# Patient Record
Sex: FEMALE | Race: WHITE | Employment: OTHER | URBAN - METROPOLITAN AREA
[De-identification: names, ages, dates, MRNs, and addresses within clinical notes are randomized per-mention and may not be internally consistent; named-entity substitution may affect disease eponyms.]

---

## 2019-10-09 PROBLEM — F32.5 MAJOR DEPRESSIVE DISORDER WITH SINGLE EPISODE, IN FULL REMISSION (HCC): Status: ACTIVE | Noted: 2019-10-09

## 2019-10-09 PROBLEM — Z12.39 BREAST CANCER SCREENING: Status: ACTIVE | Noted: 2019-10-09

## 2019-10-09 PROBLEM — I77.810 AORTIC ROOT DILATATION (HCC): Status: ACTIVE | Noted: 2019-10-09

## 2019-10-09 PROBLEM — Z00.00 ROUTINE MEDICAL EXAM: Status: ACTIVE | Noted: 2019-10-09

## 2019-10-09 PROBLEM — Z12.11 COLON CANCER SCREENING: Status: ACTIVE | Noted: 2019-10-09

## 2019-10-09 PROBLEM — I71.00 DISSECTION OF AORTA, UNSPECIFIED PORTION OF AORTA (HCC): Status: ACTIVE | Noted: 2019-10-09

## 2024-12-03 ENCOUNTER — HOSPITAL ENCOUNTER (OUTPATIENT)
Dept: CT IMAGING | Facility: HOSPITAL | Age: 69
Discharge: HOME | End: 2024-12-03
Attending: SURGERY
Payer: MEDICARE

## 2024-12-03 DIAGNOSIS — I71.010 DISSECTION OF ASCENDING AORTA (HCC): ICD-10-CM

## 2024-12-03 LAB
CREAT BLD-MCNC: 0.6 MG/DL
EGFRCR SERPLBLD CKD-EPI 2021: 97 ML/MIN/1.73M2 (ref 60–?)

## 2024-12-03 PROCEDURE — 71275 CT ANGIOGRAPHY CHEST: CPT | Performed by: SURGERY

## 2024-12-03 PROCEDURE — 74174 CTA ABD&PLVS W/CONTRAST: CPT | Performed by: SURGERY

## 2024-12-03 PROCEDURE — 82565 ASSAY OF CREATININE: CPT

## 2024-12-09 RX ORDER — ASPIRIN 81 MG/1
81 TABLET ORAL DAILY
COMMUNITY

## 2024-12-09 RX ORDER — LISINOPRIL 2.5 MG/1
2.5 TABLET ORAL DAILY
COMMUNITY
End: 2024-12-16

## 2024-12-09 NOTE — PAT NURSING NOTE
PAT nursing note: late add-on; spoke with daughter Karissa; strict npo after 2200; shower with antibacterial soap; continue to take all prescribed medications as directed except: the morning of surgery only take Metoprolol and Aspirin with a sip of water; last dose of vitamins, supplements, herbal products and NSAIDs today; last dose of Marijuana today; denies taking blood thinners, ACEs, ARBs, diabetic or weight loss medications; no PPM, ICD, no nerve or spinal cord stimulator; admit; 2 visitors welcome; park in the [employer] garage at [facility]; register at the [facility] reception desk in the lobby at 0600 am; keep personal possessions to a minimum: bring insurance card(s)/picture ID, toiletries, wear comfortable clothing, bring glasses/eye glass case, bring denture cup/supplies; leave all valuables at home, including jewelry; discussed intra-op and post-op instructions; all questions answered; daughter verbalized understanding of all instructions.

## 2024-12-10 ENCOUNTER — HOSPITAL ENCOUNTER (INPATIENT)
Facility: HOSPITAL | Age: 69
LOS: 6 days | Discharge: HOME HEALTH | End: 2024-12-16
Attending: SURGERY | Admitting: SURGERY
Payer: MEDICARE

## 2024-12-10 ENCOUNTER — ANESTHESIA EVENT (OUTPATIENT)
Dept: CARDIAC SURGERY | Facility: HOSPITAL | Age: 69
End: 2024-12-10
Payer: MEDICARE

## 2024-12-10 ENCOUNTER — ANESTHESIA (OUTPATIENT)
Dept: CARDIAC SURGERY | Facility: HOSPITAL | Age: 69
End: 2024-12-10
Payer: MEDICARE

## 2024-12-10 ENCOUNTER — APPOINTMENT (OUTPATIENT)
Dept: GENERAL RADIOLOGY | Facility: HOSPITAL | Age: 69
End: 2024-12-10
Attending: ANESTHESIOLOGY
Payer: MEDICARE

## 2024-12-10 LAB
ANION GAP SERPL CALC-SCNC: 7 MMOL/L (ref 0–18)
ANTIBODY SCREEN: NEGATIVE
APTT PPP: 26.8 SECONDS (ref 23–36)
BUN BLD-MCNC: 11 MG/DL (ref 9–23)
CALCIUM BLD-MCNC: 7.6 MG/DL (ref 8.7–10.4)
CHLORIDE SERPL-SCNC: 114 MMOL/L (ref 98–112)
CO2 SERPL-SCNC: 22 MMOL/L (ref 21–32)
CREAT BLD-MCNC: 0.58 MG/DL
EGFRCR SERPLBLD CKD-EPI 2021: 98 ML/MIN/1.73M2 (ref 60–?)
ERYTHROCYTE [DISTWIDTH] IN BLOOD BY AUTOMATED COUNT: 14.8 %
GLUCOSE BLD-MCNC: 115 MG/DL (ref 70–99)
HCT VFR BLD AUTO: 34.6 %
HGB BLD-MCNC: 11.8 G/DL
HGB BLD-MCNC: 12.8 G/DL
INR BLD: 1.01 (ref 0.8–1.2)
INR BLD: 1.2 (ref 0.8–1.2)
MCH RBC QN AUTO: 32.4 PG (ref 26–34)
MCHC RBC AUTO-ENTMCNC: 34.1 G/DL (ref 31–37)
MCV RBC AUTO: 95.1 FL
MRSA DNA SPEC QL NAA+PROBE: NEGATIVE
OSMOLALITY SERPL CALC.SUM OF ELEC: 296 MOSM/KG (ref 275–295)
PLATELET # BLD AUTO: 123 10(3)UL (ref 150–450)
POTASSIUM SERPL-SCNC: 3.6 MMOL/L (ref 3.5–5.1)
PROTHROMBIN TIME: 13.4 SECONDS (ref 11.6–14.8)
PROTHROMBIN TIME: 15.3 SECONDS (ref 11.6–14.8)
RBC # BLD AUTO: 3.64 X10(6)UL
RH BLOOD TYPE: POSITIVE
RH BLOOD TYPE: POSITIVE
SODIUM SERPL-SCNC: 143 MMOL/L (ref 136–145)
WBC # BLD AUTO: 7.4 X10(3) UL (ref 4–11)

## 2024-12-10 PROCEDURE — B440ZZ3 ULTRASONOGRAPHY OF ABDOMINAL AORTA, INTRAVASCULAR: ICD-10-PCS | Performed by: SURGERY

## 2024-12-10 PROCEDURE — B410YZZ FLUOROSCOPY OF ABDOMINAL AORTA USING OTHER CONTRAST: ICD-10-PCS | Performed by: SURGERY

## 2024-12-10 PROCEDURE — 71045 X-RAY EXAM CHEST 1 VIEW: CPT | Performed by: ANESTHESIOLOGY

## 2024-12-10 PROCEDURE — B310YZZ FLUOROSCOPY OF THORACIC AORTA USING OTHER CONTRAST: ICD-10-PCS | Performed by: SURGERY

## 2024-12-10 PROCEDURE — 02VW3DZ RESTRICTION OF THORACIC AORTA, DESCENDING WITH INTRALUMINAL DEVICE, PERCUTANEOUS APPROACH: ICD-10-PCS | Performed by: SURGERY

## 2024-12-10 PROCEDURE — B41GYZZ FLUOROSCOPY OF LEFT LOWER EXTREMITY ARTERIES USING OTHER CONTRAST: ICD-10-PCS | Performed by: SURGERY

## 2024-12-10 PROCEDURE — B41FYZZ FLUOROSCOPY OF RIGHT LOWER EXTREMITY ARTERIES USING OTHER CONTRAST: ICD-10-PCS | Performed by: SURGERY

## 2024-12-10 PROCEDURE — 04HJ3DZ INSERTION OF INTRALUMINAL DEVICE INTO LEFT EXTERNAL ILIAC ARTERY, PERCUTANEOUS APPROACH: ICD-10-PCS | Performed by: SURGERY

## 2024-12-10 PROCEDURE — B340ZZ3 ULTRASONOGRAPHY OF THORACIC AORTA, INTRAVASCULAR: ICD-10-PCS | Performed by: SURGERY

## 2024-12-10 PROCEDURE — B44HZZ3 ULTRASONOGRAPHY OF BILATERAL LOWER EXTREMITY ARTERIES, INTRAVASCULAR: ICD-10-PCS | Performed by: SURGERY

## 2024-12-10 PROCEDURE — 04V03FZ RESTRICTION OF ABDOMINAL AORTA WITH BRANCHED OR FENESTRATED INTRALUMINAL DEVICE, THREE OR MORE ARTERIES, PERCUTANEOUS APPROACH: ICD-10-PCS | Performed by: SURGERY

## 2024-12-10 DEVICE — IMPLANTABLE DEVICE: Type: IMPLANTABLE DEVICE | Site: ABDOMEN | Status: FUNCTIONAL

## 2024-12-10 DEVICE — ICAST COVERED STENT SYSTEM, 7MMX38MMX120CM
Type: IMPLANTABLE DEVICE | Status: FUNCTIONAL
Brand: ICAST COVERED STENT SYSTEM, 7MMX38MMX120CM

## 2024-12-10 DEVICE — ZENITH, SPIRAL-Z AAA ILIAC LEG
Type: IMPLANTABLE DEVICE | Status: FUNCTIONAL
Brand: ZENITH SPIRAL-Z

## 2024-12-10 DEVICE — ZENITH FLEX, AAA ENDOVASCULAR GRAFT MAIN BODY
Type: IMPLANTABLE DEVICE | Status: FUNCTIONAL
Brand: ZENITH FLEX

## 2024-12-10 DEVICE — ICAST COVERED STENT SYSTEM, 7MMX22MMX120CM
Type: IMPLANTABLE DEVICE | Status: FUNCTIONAL
Brand: ICAST COVERED STENT SYSTEM, 7MMX22MMX120CM

## 2024-12-10 RX ORDER — HYDROCODONE BITARTRATE AND ACETAMINOPHEN 10; 325 MG/1; MG/1
2 TABLET ORAL EVERY 6 HOURS PRN
Status: DISCONTINUED | OUTPATIENT
Start: 2024-12-10 | End: 2024-12-16

## 2024-12-10 RX ORDER — PHENYLEPHRINE HCL IN 0.9% NACL 50MG/250ML
PLASTIC BAG, INJECTION (ML) INTRAVENOUS CONTINUOUS PRN
Status: DISCONTINUED | OUTPATIENT
Start: 2024-12-10 | End: 2024-12-13

## 2024-12-10 RX ORDER — DOCUSATE SODIUM 100 MG/1
100 CAPSULE, LIQUID FILLED ORAL 2 TIMES DAILY
Status: DISCONTINUED | OUTPATIENT
Start: 2024-12-10 | End: 2024-12-16

## 2024-12-10 RX ORDER — HYDROCODONE BITARTRATE AND ACETAMINOPHEN 10; 325 MG/1; MG/1
1 TABLET ORAL EVERY 6 HOURS PRN
Status: DISCONTINUED | OUTPATIENT
Start: 2024-12-10 | End: 2024-12-16

## 2024-12-10 RX ORDER — ESMOLOL HYDROCHLORIDE 10 MG/ML
INJECTION INTRAVENOUS AS NEEDED
Status: DISCONTINUED | OUTPATIENT
Start: 2024-12-10 | End: 2024-12-10 | Stop reason: SURG

## 2024-12-10 RX ORDER — ONDANSETRON 2 MG/ML
INJECTION INTRAMUSCULAR; INTRAVENOUS AS NEEDED
Status: DISCONTINUED | OUTPATIENT
Start: 2024-12-10 | End: 2024-12-10 | Stop reason: SURG

## 2024-12-10 RX ORDER — HEPARIN SODIUM 5000 [USP'U]/ML
INJECTION, SOLUTION INTRAVENOUS; SUBCUTANEOUS AS NEEDED
Status: DISCONTINUED | OUTPATIENT
Start: 2024-12-10 | End: 2024-12-10 | Stop reason: SURG

## 2024-12-10 RX ORDER — SODIUM CHLORIDE, SODIUM LACTATE, POTASSIUM CHLORIDE, CALCIUM CHLORIDE 600; 310; 30; 20 MG/100ML; MG/100ML; MG/100ML; MG/100ML
INJECTION, SOLUTION INTRAVENOUS CONTINUOUS
Status: DISCONTINUED | OUTPATIENT
Start: 2024-12-10 | End: 2024-12-10

## 2024-12-10 RX ORDER — ENOXAPARIN SODIUM 100 MG/ML
30 INJECTION SUBCUTANEOUS DAILY
Status: DISCONTINUED | OUTPATIENT
Start: 2024-12-10 | End: 2024-12-16

## 2024-12-10 RX ORDER — HYDROMORPHONE HYDROCHLORIDE 1 MG/ML
0.2 INJECTION, SOLUTION INTRAMUSCULAR; INTRAVENOUS; SUBCUTANEOUS EVERY 2 HOUR PRN
Status: DISCONTINUED | OUTPATIENT
Start: 2024-12-10 | End: 2024-12-16

## 2024-12-10 RX ORDER — METOCLOPRAMIDE HYDROCHLORIDE 5 MG/ML
10 INJECTION INTRAMUSCULAR; INTRAVENOUS EVERY 8 HOURS PRN
Status: DISCONTINUED | OUTPATIENT
Start: 2024-12-10 | End: 2024-12-16

## 2024-12-10 RX ORDER — METOCLOPRAMIDE HYDROCHLORIDE 5 MG/ML
INJECTION INTRAMUSCULAR; INTRAVENOUS AS NEEDED
Status: DISCONTINUED | OUTPATIENT
Start: 2024-12-10 | End: 2024-12-10 | Stop reason: SURG

## 2024-12-10 RX ORDER — NITROGLYCERIN 20 MG/100ML
INJECTION INTRAVENOUS CONTINUOUS PRN
Status: DISCONTINUED | OUTPATIENT
Start: 2024-12-10 | End: 2024-12-13

## 2024-12-10 RX ORDER — SODIUM CHLORIDE, SODIUM LACTATE, POTASSIUM CHLORIDE, CALCIUM CHLORIDE 600; 310; 30; 20 MG/100ML; MG/100ML; MG/100ML; MG/100ML
INJECTION, SOLUTION INTRAVENOUS CONTINUOUS PRN
Status: DISCONTINUED | OUTPATIENT
Start: 2024-12-10 | End: 2024-12-10 | Stop reason: SURG

## 2024-12-10 RX ORDER — NALOXONE HYDROCHLORIDE 0.4 MG/ML
0.08 INJECTION, SOLUTION INTRAMUSCULAR; INTRAVENOUS; SUBCUTANEOUS AS NEEDED
Status: ACTIVE | OUTPATIENT
Start: 2024-12-10 | End: 2024-12-10

## 2024-12-10 RX ORDER — SODIUM CHLORIDE, SODIUM LACTATE, POTASSIUM CHLORIDE, CALCIUM CHLORIDE 600; 310; 30; 20 MG/100ML; MG/100ML; MG/100ML; MG/100ML
INJECTION, SOLUTION INTRAVENOUS CONTINUOUS
Status: DISCONTINUED | OUTPATIENT
Start: 2024-12-10 | End: 2024-12-11

## 2024-12-10 RX ORDER — ONDANSETRON 2 MG/ML
4 INJECTION INTRAMUSCULAR; INTRAVENOUS ONCE AS NEEDED
Status: ACTIVE | OUTPATIENT
Start: 2024-12-10 | End: 2024-12-10

## 2024-12-10 RX ORDER — HYDROMORPHONE HYDROCHLORIDE 1 MG/ML
0.8 INJECTION, SOLUTION INTRAMUSCULAR; INTRAVENOUS; SUBCUTANEOUS EVERY 2 HOUR PRN
Status: DISCONTINUED | OUTPATIENT
Start: 2024-12-10 | End: 2024-12-16

## 2024-12-10 RX ORDER — ASPIRIN 81 MG/1
81 TABLET ORAL DAILY
Status: DISCONTINUED | OUTPATIENT
Start: 2024-12-10 | End: 2024-12-16

## 2024-12-10 RX ORDER — LABETALOL HYDROCHLORIDE 5 MG/ML
5 INJECTION, SOLUTION INTRAVENOUS EVERY 5 MIN PRN
Status: ACTIVE | OUTPATIENT
Start: 2024-12-10 | End: 2024-12-10

## 2024-12-10 RX ORDER — HYDROMORPHONE HYDROCHLORIDE 1 MG/ML
0.4 INJECTION, SOLUTION INTRAMUSCULAR; INTRAVENOUS; SUBCUTANEOUS EVERY 2 HOUR PRN
Status: DISCONTINUED | OUTPATIENT
Start: 2024-12-10 | End: 2024-12-16

## 2024-12-10 RX ORDER — DEXAMETHASONE SODIUM PHOSPHATE 4 MG/ML
VIAL (ML) INJECTION AS NEEDED
Status: DISCONTINUED | OUTPATIENT
Start: 2024-12-10 | End: 2024-12-10 | Stop reason: SURG

## 2024-12-10 RX ORDER — SODIUM CHLORIDE 9 MG/ML
INJECTION, SOLUTION INTRAVENOUS CONTINUOUS
Status: DISCONTINUED | OUTPATIENT
Start: 2024-12-10 | End: 2024-12-10

## 2024-12-10 RX ORDER — HYDROMORPHONE HYDROCHLORIDE 1 MG/ML
0.4 INJECTION, SOLUTION INTRAMUSCULAR; INTRAVENOUS; SUBCUTANEOUS EVERY 5 MIN PRN
Status: ACTIVE | OUTPATIENT
Start: 2024-12-10 | End: 2024-12-10

## 2024-12-10 RX ORDER — PROTAMINE SULFATE 10 MG/ML
INJECTION, SOLUTION INTRAVENOUS AS NEEDED
Status: DISCONTINUED | OUTPATIENT
Start: 2024-12-10 | End: 2024-12-10 | Stop reason: SURG

## 2024-12-10 RX ORDER — ONDANSETRON 2 MG/ML
4 INJECTION INTRAMUSCULAR; INTRAVENOUS EVERY 6 HOURS PRN
Status: DISCONTINUED | OUTPATIENT
Start: 2024-12-10 | End: 2024-12-16

## 2024-12-10 RX ADMIN — METOCLOPRAMIDE HYDROCHLORIDE 10 MG: 5 INJECTION INTRAMUSCULAR; INTRAVENOUS at 08:47:00

## 2024-12-10 RX ADMIN — HEPARIN SODIUM 1000 UNITS: 5000 INJECTION, SOLUTION INTRAVENOUS; SUBCUTANEOUS at 10:59:00

## 2024-12-10 RX ADMIN — PROTAMINE SULFATE 10 MG: 10 INJECTION, SOLUTION INTRAVENOUS at 13:21:00

## 2024-12-10 RX ADMIN — SODIUM CHLORIDE, SODIUM LACTATE, POTASSIUM CHLORIDE, CALCIUM CHLORIDE: 600; 310; 30; 20 INJECTION, SOLUTION INTRAVENOUS at 10:59:00

## 2024-12-10 RX ADMIN — HEPARIN SODIUM 1000 UNITS: 5000 INJECTION, SOLUTION INTRAVENOUS; SUBCUTANEOUS at 09:32:00

## 2024-12-10 RX ADMIN — ESMOLOL HYDROCHLORIDE 20 MG: 10 INJECTION INTRAVENOUS at 10:18:00

## 2024-12-10 RX ADMIN — HEPARIN SODIUM 2000 UNITS: 5000 INJECTION, SOLUTION INTRAVENOUS; SUBCUTANEOUS at 12:30:00

## 2024-12-10 RX ADMIN — ONDANSETRON 4 MG: 2 INJECTION INTRAMUSCULAR; INTRAVENOUS at 13:30:00

## 2024-12-10 RX ADMIN — SODIUM CHLORIDE, SODIUM LACTATE, POTASSIUM CHLORIDE, CALCIUM CHLORIDE: 600; 310; 30; 20 INJECTION, SOLUTION INTRAVENOUS at 07:57:00

## 2024-12-10 RX ADMIN — DEXAMETHASONE SODIUM PHOSPHATE 8 MG: 4 MG/ML VIAL (ML) INJECTION at 08:47:00

## 2024-12-10 RX ADMIN — HEPARIN SODIUM 7000 UNITS: 5000 INJECTION, SOLUTION INTRAVENOUS; SUBCUTANEOUS at 09:05:00

## 2024-12-10 RX ADMIN — PROTAMINE SULFATE 40 MG: 10 INJECTION, SOLUTION INTRAVENOUS at 13:24:00

## 2024-12-10 NOTE — ANESTHESIA PROCEDURE NOTES
Central Line    Performed by: Oni Masters MD  Authorized by: Oni Masters MD    General Information and Staff    Procedure Start:   Anesthesiologist:  Oni Masters MD  Performed by:  Anesthesiologist  Patient Location:  OR  Indication: central venous access and CVP monitoring    Site Identification: real time ultrasound guided    Preanesthetic Checklist: 2 patient identifiers, IV checked, risks and benefits discussed, monitors and equipment checked, pre-op evaluation, timeout performed, anesthesia consent and sterile technique used    Procedure Detail    Patient Position:  Trendelenburg  Laterality:  Right  Site:  Internal jugular  Prep:  Chloraprep  Catheter Size:  9 Fr  Catheter Type:  MAC introducer  Number of Lumens:  Double lumen  Procedure Detail: target vein identified, needle advanced into vein and blood aspirated and guidewire advanced into vein    Seldinger Technique?: Yes    Intravenous Verification: verified by ultrasound and venous blood return    Post Insertion: all ports aspirated, all ports flushed easily, guidewire was removed intact, line was sutured in place and dressing was applied      Assessment    Events: patient tolerated procedure well with no complications      PA Catheter Placement    PA Catheter Placed?: No      Additional Comments     Triple lumen slick placed via introducer lumen. All ports aspirated and flushed.

## 2024-12-10 NOTE — ANESTHESIA PROCEDURE NOTES
Airway  Date/Time: 12/10/2024 8:08 AM  Urgency: elective      General Information and Staff    Patient location during procedure: OR  Anesthesiologist: Oni Masters MD  Performed: anesthesiologist   Performed by: Oni Masters MD  Authorized by: Oni Masters MD      Indications and Patient Condition  Indications for airway management: anesthesia  Sedation level: deep  Preoxygenated: yes  Patient position: sniffing  Mask difficulty assessment: 0 - not attempted    Final Airway Details  Final airway type: endotracheal airway      Successful airway: ETT  Cuffed: yes   Successful intubation technique: direct laryngoscopy  Endotracheal tube insertion site: oral  Blade: Jordana  Blade size: #3  ETT size (mm): 7.5    Cormack-Lehane Classification: grade I - full view of glottis  Placement verified by: capnometry   Measured from: lips  ETT to lips (cm): 21  Number of attempts at approach: 1

## 2024-12-10 NOTE — ANESTHESIA PREPROCEDURE EVALUATION
PRE-OP EVALUATION    Patient Name: Sherri Gillette    Admit Diagnosis: dissection of thoracoabdominal aortic aneurysm    Pre-op Diagnosis: dissection of thoracoabdominal aortic aneurysm    ENDOVASCULAR ABDOMINAL AORTIC ANEURYSM  STENT GRAFT REPAIR WITH FENESTRATED DEVICE, NEUROMONITORING    Anesthesia Procedure: ENDOVASCULAR ABDOMINAL AORTIC ANEURYSM  STENT GRAFT REPAIR WITH FENESTRATED DEVICE, NEUROMONITORING    Surgeons and Role:     * Hamlet Hoffman MD - Primary     * Moe Camargo MD    Pre-op vitals reviewed.        Body mass index is 18.71 kg/m².    Current medications reviewed.  Hospital Medications:  No current facility-administered medications on file as of 12/10/2024.       Outpatient Medications:   Prescriptions Prior to Admission[1]    Allergies: Patient has no known allergies.      Anesthesia Evaluation    Patient summary reviewed.    Anesthetic Complications  (-) history of anesthetic complications         GI/Hepatic/Renal    Negative GI/hepatic/renal ROS.                             Cardiovascular  Comment: Chronic Type B dissection. S/p aortic root replacement with revision sternotomy, ascending aorta and arch replacement      Exercise tolerance: poor           (+) hypertension     (+) CAD                                Endo/Other    Negative endo/other ROS.                              Pulmonary    Negative pulmonary ROS.                       Neuro/Psych      (+) depression    (+) CVA                            Past Surgical History:   Procedure Laterality Date    Ascending aortic graft  2005    [facility] [place]    Ascending aortic graft  09/22/2015    REDO sternotomy w/ ascending aorta and arch replacement    Chemotherapy  1999    Lumpectomy right  03/1999    Mastectomy right  2003    Other surgical history      Mastectomy    Percutaneous cv procedures  01/26/2016    percutaneous thoracic aortic endograft, [facility]    Radiation  right  1999     Social History     Socioeconomic History    Marital status:    Tobacco Use    Smoking status: Former    Smokeless tobacco: Former     Quit date: 11/28/2014    Tobacco comments:     0.25 units per day, 30 years, 7 unit years   Vaping Use    Vaping status: Never Used   Substance and Sexual Activity    Alcohol use: Yes     Comment: wine rarely    Drug use: Yes     Types: Cannabis   Other Topics Concern    Caffeine Concern Yes     Comment: Coffee, soda > 6 cups daily     History   Drug Use    Types: Cannabis     Available pre-op labs reviewed.               Airway      Mallampati: II  Mouth opening: >3 FB  TM distance: 4 - 6 cm  Neck ROM: full Cardiovascular    Cardiovascular exam normal.         Dental  Comment: Multiple missing teeth, poor dentition           Pulmonary    Pulmonary exam normal.                 Other findings              ASA: 3   Plan: general  NPO status verified and patient meets guidelines.          Plan/risks discussed with: patient            Plan for GA with ETT placement, arterial line, CVC placement. Neuromonitoring regimen. Possible lumbar drain. A detailed discussion of the risks and benefits of the proposed anesthetic was had in the preoperative area. Specifically, the common intrinsic risks of a general anesthetic were discussed including reasonable postoperative pain expectations for the procedure, nausea/vomiting, dental damage, sore throat and adverse reactions related to medications administered. Questions answered and patient wishes to proceed.         Present on Admission:  **None**             [1]   Medications Prior to Admission   Medication Sig Dispense Refill Last Dose/Taking    lisinopril 2.5 MG Oral Tab Take 1 tablet (2.5 mg total) by mouth daily.   12/10/2024 Morning    aspirin 81 MG Oral Tab EC Take 1 tablet (81 mg total) by mouth daily.   12/10/2024 Morning    metoprolol Tartrate 25 MG Oral Tab Take one tablet by mouth twice daily 180 tablet 3  12/10/2024 Morning    Clopidogrel Bisulfate 75 MG Oral Tab Take 1 tablet (75 mg total) by mouth daily. 90 tablet 3     buPROPion HCl ER, XL, (WELLBUTRIN XL) 150 MG Oral Tablet 24 Hr Take 1 tablet (150 mg total) by mouth daily. 30 tablet 5

## 2024-12-10 NOTE — ANESTHESIA POSTPROCEDURE EVALUATION
[facility]    Sherri Gillette Patient Status:  Inpatient   Age/Gender 69 year old female MRN [id number]   Location [facility] 4SW-A Attending Hamlet Hoffman MD   Hosp Day # 0 PCP Sb Villa DO       Anesthesia Post-op Note    ENDOVASCULAR ABDOMINAL AORTIC ANEURYSM  STENT GRAFT REPAIR WITH FENESTRATED DEVICE, NEUROMONITORING. FOR MORE INFO SEE CATH LAB CHARTING    Procedure Summary       Date: 12/10/24 Room / Location:  CVOR 03 HYBRID /  CVOR    Anesthesia Start: 0757 Anesthesia Stop: 1355    Procedure: ENDOVASCULAR ABDOMINAL AORTIC ANEURYSM  STENT GRAFT REPAIR WITH FENESTRATED DEVICE, NEUROMONITORING. FOR MORE INFO SEE CATH LAB CHARTING Diagnosis: (dissection of thoracoabdominal aortic aneurysm)    Surgeons: Hamlet Hoffman MD Anesthesiologist: Oni Masters MD    Anesthesia Type: general ASA Status: 3            Anesthesia Type: general    Vitals Value Taken Time   /80 12/10/24 1355   Temp 97.8 °F (36.6 °C) 12/10/24 1355   Pulse 61 12/10/24 1355   Resp 15 12/10/24 1355   SpO2 97 % 12/10/24 1355   Vitals shown include unfiled device data.    Patient Location: ICU    Anesthesia Type: general    Airway Patency: patent and extubated    Postop Pain Control: adequate    Mental Status: mildly sedated but able to meaningfully participate in the post-anesthesia evaluation    Nausea/Vomiting: none    Cardiopulmonary/Hydration status: stable euvolemic    Complications: no apparent anesthesia related complications    Postop vital signs: stable    Dental Exam: Unchanged from Preop    Sign out given to ICU staff.

## 2024-12-10 NOTE — CONSULTS
General Medicine Consult      Reason for consult: Medical management    Consulted by: Dr. Hoffman    PCP: Sb Villa DO      History of Present Illness: Patient is a 69 year old female with PMH sig for hypertension, CVA, PAD abdominal aneurysm who presents for elective repair.  Patient seen postoperatively.  Her pain is controlled.  She denies any chest pain or shortness of breath.  Family is at bedside.    PMH:  Past Medical History:    Atherosclerosis of coronary artery    Breast cancer (HCC)    right mastectomy 2003    Depression    High blood pressure    Peripheral vascular disease (HCC)    Stroke (cerebrum) (HCC)    TIA march.17 2015        PSH:  Past Surgical History:   Procedure Laterality Date    Ascending aortic graft  2005    [facility] [place]    Ascending aortic graft  09/22/2015    REDO sternotomy w/ ascending aorta and arch replacement    Chemotherapy  1999    Lumpectomy right  03/1999    Mastectomy right  2003    Other surgical history      Mastectomy    Percutaneous cv procedures  01/26/2016    percutaneous thoracic aortic endograft, [facility] [place]    Radiation right  1999        Home Medications:  Medications Taking[1]    Scheduled Medication:   aspirin  81 mg Oral Daily    enoxaparin  30 mg Subcutaneous Daily    ceFAZolin  2 g Intravenous Q8H    fentaNYL        potassium chloride  40 mEq Intravenous Once    docusate sodium  100 mg Oral BID     Continuous Infusing Medication:   nitroGLYCERIN in dextrose 5%      niCARdipine Stopped (12/10/24 1509)    phenylephrine Stopped (12/10/24 1610)    norepinephrine      lactated ringers 100 mL/hr at 12/10/24 1610     PRN Medication:  ondansetron    metoclopramide    nitroGLYCERIN in dextrose 5%    niCARdipine    phenylephrine    norepinephrine    atropine    naloxone    labetalol    ondansetron    fentaNYL    HYDROmorphone **OR** HYDROmorphone **OR** HYDROmorphone    HYDROcodone-acetaminophen **OR**  HYDROcodone-acetaminophen     ALL:  Allergies[2]     Soc Hx:  Social History     Tobacco Use    Smoking status: Former    Smokeless tobacco: Former     Quit date: 11/28/2014    Tobacco comments:     0.25 units per day, 30 years, 7 unit years   Substance Use Topics    Alcohol use: Yes     Comment: wine rarely        Fam Hx  Family History   Problem Relation Age of Onset    Cancer Father     Heart Disease Mother     Cancer Other         Cancer-colon, Aunt       Review of Systems  Comprehensive ROS reviewed and negative except for what's stated above.  Including negative for chest pain, shortness of breath, syncope.       OBJECTIVE:  /53 (BP Location: Left arm)   Pulse 73   Temp 97.7 °F (36.5 °C) (Temporal)   Resp 20   Ht 5' 3\" (1.6 m)   Wt 105 lb 9.6 oz (47.9 kg)   SpO2 100%   BMI 18.71 kg/m²   General:  Alert, no distress, appears stated age.   Head:  Normocephalic, without obvious abnormality, atraumatic.   Eyes:  Sclera anicteric, No conjunctival pallor, EOMs intact.    Nose: Nares normal. Septum midline. Mucosa normal. No drainage.   Throat: Lips, mucosa, and tongue normal. Teeth and gums normal.   Neck: Supple, symmetrical, trachea midline   Lungs:   Clear to auscultation bilaterally. Normal effort   Chest wall:  No tenderness or deformity.   Heart:  Regular rate and rhythm, S1, S2 normal,   Abdomen:   Soft, non-tender. Bowel sounds normal.  Non distended   Extremities: Extremities normal, atraumatic, no cyanosis or edema.,  Cath site is free of hematoma, gait not assessed   Skin: Skin color, texture, turgor normal. No rashes or lesions.    Neurologic: Normal strength, no focal deficit appreciated       Diagnostics:   CBC/Chem  Recent Labs   Lab 12/10/24  0621 12/10/24  1433   WBC  --  7.4   HGB  --  11.8*   MCV  --  95.1   PLT  --  123.0*   INR 1.01 1.20       Recent Labs   Lab 12/10/24  1433      K 3.6   *   CO2 22.0   BUN 11   CREATSERUM 0.58   *   CA 7.6*       No results for  input(s): \"ALT\", \"AST\", \"ALB\", \"AMYLASE\", \"LIPASE\", \"LDH\" in the last 168 hours.    Invalid input(s): \"ALPHOS\", \"TBIL\", \"DBIL\", \"TPROT\"    No results for input(s): \"TROP\" in the last 168 hours.      Radiology: XR CHEST AP PORTABLE  (CPT=71045)    Result Date: 12/10/2024  CONCLUSION:   Postoperative changes of cardiothoracic surgery including placement of aortic stent graft.  Heart size upper limits normal.  Findings of pulmonary venous hypertension with mild interstitial pulmonary edema.  No discrete airspace consolidation.  The pleural spaces are clear.  Right IJ sheath in place with central venous catheter terminating in the mid SVC.   LOCATION:  [id number]      Dictated by (CST): Apurva Machado MD on 12/10/2024 at 3:09 PM     Finalized by (CST): Apurva Machado MD on 12/10/2024 at 3:10 PM          ASSESSMENT / PLAN:    Patient is a 69 year old female with PMH sig for hypertension, CVA, PAD abdominal aneurysm who presents for elective repair.    Abdominal aortic aneurysm  Status post endovascular repair of abdominal aortic aneurysm, POD 0  Postoperative pain  - IV/p.o. pain medications  - Blood pressure control per vascular surgery  - Monitor for acute blood loss anemia, follow CBC  -Continue aspirin  - Vascular surgery following, recommendations reviewed    Hypertension  - Holding lisinopril and metoprolol for now  - Using PRNs to allow for tighter blood control    Prophylaxis:   DVT with SCDs  Atrophy Prophylaxis ambulate as tolerated  Lines/Monitors:     Dispo: ICU management  Code status:     Patient and/or patient's family given opportunity to ask questions and note understanding and agreeing with therapeutic plan as outlined    Thank you for allowing me to participate in the care of this patient.     Thank You,  Corry Pope DO    [facility]  Pager   Answering Service number: [phone]                            [1]   Outpatient Medications Marked as Taking for the 12/10/24 encounter (Hospital Encounter)    Medication Sig Dispense Refill    lisinopril 2.5 MG Oral Tab Take 1 tablet (2.5 mg total) by mouth daily.      aspirin 81 MG Oral Tab EC Take 1 tablet (81 mg total) by mouth daily.      metoprolol Tartrate 25 MG Oral Tab Take one tablet by mouth twice daily 180 tablet 3   [2] No Known Allergies

## 2024-12-10 NOTE — ANESTHESIA PROCEDURE NOTES
Arterial Line    Performed by: Oni Masters MD  Authorized by: Oni Masters MD    General Information and Staff    Procedure Start:   Anesthesiologist:  Oni Masters MD  Performed By:  Anesthesiologist  Patient Location:  OR  Indication: continuous blood pressure monitoring and blood sampling needed    Site Identification: real time ultrasound guided    Preanesthetic Checklist: 2 patient identifiers, IV checked, risks and benefits discussed, monitors and equipment checked, pre-op evaluation, timeout performed, anesthesia consent and sterile technique used    Procedure Details    Catheter Size:  20 G  Catheter Length:  1 and 3/4 inch  Catheter Type:  Arrow  Seldinger Technique?: Yes    Laterality:  Right  Site:  Radial artery  Site Prep: chlorhexidine    Line Secured:  Tape and Tegaderm    Assessment    Events: patient tolerated procedure well with no complications      Medications      Additional Comments    Ultrasound-guided attempt x 1

## 2024-12-10 NOTE — CONSULTS
ICU  Critical Care APRN Consult Note    NAME: Sherri Gillette - ROOM:  CVOR/ CVOR - MRN: [id number] - Age: 69 year old - :10/19/1955    History Of Present Illness:  Sherri Gillette is a 69 year old female with PMHx significant for descending thoracic aneurysm, PVD, TIA, HTN, depression who is being admitted to the ICU s/p endovascular abdominal aortic aneurysm stent graft repair with fenestrated device.     Patient is awake, oriented x4 and complaining of lower back pain. SBP 170s with goal -160.     Past Medical History:  Past Medical History:    Atherosclerosis of coronary artery    Breast cancer (HCC)    right mastectomy     Depression    High blood pressure    Peripheral vascular disease (HCC)    Stroke [facility]cerebrum) ([facility])    TIA 2015     Past Surgical History:   Past Surgical History:   Procedure Laterality Date    Ascending aortic graft      [facility] [place]    Ascending aortic graft  2015    REDO sternotomy w/ ascending aorta and arch replacement    Chemotherapy      Lumpectomy right  1999    Mastectomy right      Other surgical history      Mastectomy    Percutaneous cv procedures  2016    percutaneous thoracic aortic endograft, [facility] [place]    Radiation right        Family History:   Family History   Problem Relation Age of Onset    Cancer Father     Heart Disease Mother     Cancer Other         Cancer-colon, Aunt     Social History:    reports that she has quit smoking. She quit smokeless tobacco use about 10 years ago. She reports current alcohol use. She reports current drug use. Drug: Cannabis.     Review of Systems:   A comprehensive 10 point review of systems was completed.  Pertinent positives and negatives noted in the HPI.    Current Facility-Administered Medications   Medication Dose Route Frequency    aspirin DR tab 81 mg  81 mg Oral Daily    ondansetron (Zofran) 4 MG/2ML injection 4  mg  4 mg Intravenous Q6H PRN    metoclopramide (Reglan) 5 mg/mL injection 10 mg  10 mg Intravenous Q8H PRN    enoxaparin (Lovenox) 30 MG/0.3ML SUBQ injection 30 mg  30 mg Subcutaneous Daily    ceFAZolin (Ancef) 2g in 10mL IV syringe premix  2 g Intravenous Q8H    nitroGLYCERIN in dextrose 5% 50 mg/250mL infusion premix  5-400 mcg/min Intravenous Continuous PRN    niCARdipine in sodium chloride 0.86% (carDENE) 20 mg/200mL infusion premix  5-15 mg/hr Intravenous Continuous PRN    phenylephrine in sodium chloride 0.9% (Thom-Synephrine) 50 mg/250mL infusion premix   mcg/min Intravenous Continuous PRN    norepinephrine (Levophed) 4 mg/250mL infusion premix  0.5-30 mcg/min Intravenous Continuous PRN    atropine 0.1 MG/ML injection 0.5 mg  0.5 mg Intravenous PRN    naloxone (Narcan) 0.4 MG/ML injection 0.08 mg  0.08 mg Intravenous PRN    labetalol (Trandate) 5 mg/mL injection 5 mg  5 mg Intravenous Q5 Min PRN    ondansetron (Zofran) 4 MG/2ML injection 4 mg  4 mg Intravenous Once PRN    fentaNYL (Sublimaze) 50 mcg/mL injection        potassium chloride 40 mEq in 250mL sodium chloride 0.9% IVPB premix  40 mEq Intravenous Once    HYDROmorphone (Dilaudid) 1 MG/ML injection 0.2 mg  0.2 mg Intravenous Q2H PRN    Or    HYDROmorphone (Dilaudid) 1 MG/ML injection 0.4 mg  0.4 mg Intravenous Q2H PRN    Or    HYDROmorphone (Dilaudid) 1 MG/ML injection 0.8 mg  0.8 mg Intravenous Q2H PRN    HYDROcodone-acetaminophen (Norco)  MG per tab 1 tablet  1 tablet Oral Q6H PRN    Or    HYDROcodone-acetaminophen (Norco)  MG per tab 2 tablet  2 tablet Oral Q6H PRN    docusate sodium (Colace) cap 100 mg  100 mg Oral BID    lactated ringers infusion   Intravenous Continuous     OBJECTIVE  Vitals:  /53 (BP Location: Left arm)   Pulse 73   Temp 97.7 °F (36.5 °C) (Temporal)   Resp 20   Ht 160 cm (5' 3\")   Wt 105 lb 9.6 oz (47.9 kg)   SpO2 100%   BMI 18.71 kg/m²               Physical Exam:    General Appearance: Alert,  cooperative, no distress  Neck: No JVD  Lungs: diminished to auscultation bilaterally, respirations unlabored  Heart: Regular rate and rhythm, S1 and S2 normal, no murmur, rub or gallop  Abdomen: Soft, non-tender, bowel sounds hypoactive  Extremities: Atraumatic, no cyanosis or edema, capillary refill <3 sec.  Bilateral groins with + femoral pulses, dressings intact with no drainage noted  Pulses: 2+ and symmetric all extremities  Skin: Skin color, texture, turgor normal for ethnicity, no rashes or lesions, warm and dry  Neurologic: normal strength, sensation intact bilateral LEs, denies numbness or tingling    Data this admission:  CTA CHEST CTA ABDOMEN CTA PELVIS (CPT=71275/94485)    Result Date: 12/3/2024  CONCLUSION:   Postoperative changes of repair of prior type A dissection.  No endoleak.  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  Dissection flap extending into the left subclavian artery is unchanged since 1/21/2006.  Dissection flap extending into the right brachiocephalic and bilateral common carotid arteries is new since 1/21/2006.  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     Dictated by (CST): Josh Valencia MD on 12/03/2024 at 2:54 PM     Finalized by (CST): Josh Valencia MD on 12/03/2024 at 3:07 PM          Labs:  Lab Results   Component Value Date    WBC 7.4 12/10/2024    HGB 11.8 12/10/2024    HCT 34.6 12/10/2024    .0 12/10/2024    CREATSERUM 0.58 12/10/2024    BUN 11 12/10/2024     12/10/2024    K 3.6 12/10/2024     12/10/2024    CO2 22.0 12/10/2024     12/10/2024    CA 7.6 12/10/2024    PTT 26.8 12/10/2024    INR 1.20 12/10/2024       Assessment/Plan:    Endovascular Abdominal Aortic Aneurysm  S/p stent with graft  -ICU monitoring post-op  -ASA, statin  -Pain mgt PRN  -Neurovascular checks Q 15mins x1 hour, Q 1 hour x4 hrs then Q 4hrs  -Bilateral  [facility] site--monitor   -Monitor BP closely--goal 140-160mmHg per vascular  -IVFs  -Post-op labs pending  -Vascular surgery following- Dr. Hoffman    CAD  -ASA, statin, BB    HTN  -BB  -Nicardipine gtt if need for BP control for goal -160mmHg post-op      F/E/N:  -IV fluids  -Follow lytes and replete prn    Proph:  -SCD  -lovenox    Dispo:     Code Status: Full Code  -ICU for close monitoring    Plan of care discussed with intensivist, Dr. Sil Powell, [facility]  ICU  Phone  [phone]   Pager [phone]    ICU Attending addendum:  - pt seen and examined with ICU APN.  Agree with A/P as detailed above.  - pt underwent scheduled repair of endovascular AAA s/p stent graft repair with fenestrated device.  No periop complications.  Requiring very tight BP control with goal SBP of 140-160 and hourly neuro-checks to ensure there is no ischemic injury to spinal cord.  Vascular surgery following closely, will monitor closely for development of neuro symptoms.  Currently on cardene gtt.  - pt is critically ill, will cont with ICU care.  Critical care time: 37 min    Scott Mujica MD

## 2024-12-10 NOTE — PLAN OF CARE
Received patient at 14:00 from operating room.  BP parameters 140-180.  Bilateral groins soft.  Pulse palpable.  Medicated with fentanyl for back pain.  Dr. Colón at bedside orders 2 hour bedrest.  Elevated HOB at 16:00.  Medicated with norcos.  Daughter at bedside.   Problem: CARDIOVASCULAR - ADULT  Goal: Maintains optimal cardiac output and hemodynamic stability  Description: INTERVENTIONS:  - Monitor vital signs, rhythm, and trends  - Monitor for bleeding, hypotension and signs of decreased cardiac output  - Evaluate effectiveness of vasoactive medications to optimize hemodynamic stability  - Monitor arterial and/or venous puncture sites for bleeding and/or hematoma  - Assess quality of pulses, skin color and temperature  - Assess for signs of decreased coronary artery perfusion - ex. Angina  - Evaluate fluid balance, assess for edema, trend weights  Outcome: Progressing  Goal: Absence of cardiac arrhythmias or at baseline  Description: INTERVENTIONS:  - Continuous cardiac monitoring, monitor vital signs, obtain 12 lead EKG if indicated  - Evaluate effectiveness of antiarrhythmic and heart rate control medications as ordered  - Initiate emergency measures for life threatening arrhythmias  - Monitor electrolytes and administer replacement therapy as ordered  Outcome: Progressing     Problem: PAIN - ADULT  Goal: Verbalizes/displays adequate comfort level or patient's stated pain goal  Description: INTERVENTIONS:  - Encourage pt to monitor pain and request assistance  - Assess pain using appropriate pain scale  - Administer analgesics based on type and severity of pain and evaluate response  - Implement non-pharmacological measures as appropriate and evaluate response  - Consider cultural and social influences on pain and pain management  - Manage/alleviate anxiety  - Utilize distraction and/or relaxation techniques  - Monitor for opioid side effects  - Notify MD/LIP if interventions unsuccessful or patient  reports new pain  - Anticipate increased pain with activity and pre-medicate as appropriate  Outcome: Progressing

## 2024-12-11 ENCOUNTER — APPOINTMENT (OUTPATIENT)
Dept: CT IMAGING | Facility: HOSPITAL | Age: 69
End: 2024-12-11
Attending: SURGERY
Payer: MEDICARE

## 2024-12-11 PROBLEM — I71.40 ABDOMINAL AORTIC ANEURYSM (AAA) (HCC): Status: ACTIVE | Noted: 2024-12-11

## 2024-12-11 LAB
ALBUMIN SERPL-MCNC: 3.2 G/DL (ref 3.2–4.8)
ALBUMIN/GLOB SERPL: 1.1 {RATIO} (ref 1–2)
ALP LIVER SERPL-CCNC: 139 U/L
ALT SERPL-CCNC: 18 U/L
ANION GAP SERPL CALC-SCNC: 10 MMOL/L (ref 0–18)
APTT PPP: 30.7 SECONDS (ref 23–36)
AST SERPL-CCNC: 38 U/L (ref ?–34)
BASE EXCESS BLD CALC-SCNC: -3 MMOL/L
BASE EXCESS BLD CALC-SCNC: -3 MMOL/L
BASE EXCESS BLD CALC-SCNC: -4 MMOL/L
BASOPHILS # BLD AUTO: 0.02 X10(3) UL (ref 0–0.2)
BASOPHILS # BLD AUTO: 0.02 X10(3) UL (ref 0–0.2)
BASOPHILS NFR BLD AUTO: 0.2 %
BASOPHILS NFR BLD AUTO: 0.2 %
BILIRUB SERPL-MCNC: 1.6 MG/DL (ref 0.2–1.1)
BLOOD TYPE BARCODE: 5100
BUN BLD-MCNC: 9 MG/DL (ref 9–23)
CA-I BLD-SCNC: 1.03 MMOL/L (ref 1.12–1.32)
CA-I BLD-SCNC: 1.05 MMOL/L (ref 1.12–1.32)
CA-I BLD-SCNC: 1.06 MMOL/L (ref 1.12–1.32)
CALCIUM BLD-MCNC: 8.2 MG/DL (ref 8.7–10.4)
CHLORIDE SERPL-SCNC: 106 MMOL/L (ref 98–112)
CO2 BLD-SCNC: 21 MMOL/L (ref 22–32)
CO2 BLD-SCNC: 22 MMOL/L (ref 22–32)
CO2 BLD-SCNC: 22 MMOL/L (ref 22–32)
CO2 SERPL-SCNC: 23 MMOL/L (ref 21–32)
CREAT BLD-MCNC: 0.55 MG/DL
EGFRCR SERPLBLD CKD-EPI 2021: 99 ML/MIN/1.73M2 (ref 60–?)
EOSINOPHIL # BLD AUTO: 0 X10(3) UL (ref 0–0.7)
EOSINOPHIL # BLD AUTO: 0 X10(3) UL (ref 0–0.7)
EOSINOPHIL NFR BLD AUTO: 0 %
EOSINOPHIL NFR BLD AUTO: 0 %
ERYTHROCYTE [DISTWIDTH] IN BLOOD BY AUTOMATED COUNT: 14.4 %
ERYTHROCYTE [DISTWIDTH] IN BLOOD BY AUTOMATED COUNT: 14.7 %
GLOBULIN PLAS-MCNC: 2.9 G/DL (ref 2–3.5)
GLUCOSE BLD-MCNC: 103 MG/DL (ref 70–99)
GLUCOSE BLD-MCNC: 109 MG/DL (ref 70–99)
GLUCOSE BLD-MCNC: 111 MG/DL (ref 70–99)
GLUCOSE BLD-MCNC: 140 MG/DL (ref 70–99)
HCO3 BLD-SCNC: 20.4 MEQ/L
HCO3 BLD-SCNC: 20.6 MEQ/L
HCO3 BLD-SCNC: 21.3 MEQ/L
HCT VFR BLD AUTO: 30.7 %
HCT VFR BLD AUTO: 34.7 %
HCT VFR BLD CALC: 35 %
HCT VFR BLD CALC: 35 %
HCT VFR BLD CALC: 38 %
HGB BLD-MCNC: 10.6 G/DL
HGB BLD-MCNC: 11.8 G/DL
IMM GRANULOCYTES # BLD AUTO: 0.05 X10(3) UL (ref 0–1)
IMM GRANULOCYTES # BLD AUTO: 0.06 X10(3) UL (ref 0–1)
IMM GRANULOCYTES NFR BLD: 0.5 %
IMM GRANULOCYTES NFR BLD: 0.6 %
INR BLD: 1.12 (ref 0.8–1.2)
ISTAT ACTIVATED CLOTTING TIME: 124 SECONDS (ref 74–137)
ISTAT ACTIVATED CLOTTING TIME: 182 SECONDS (ref 74–137)
ISTAT ACTIVATED CLOTTING TIME: 210 SECONDS (ref 74–137)
ISTAT ACTIVATED CLOTTING TIME: 222 SECONDS (ref 74–137)
ISTAT ACTIVATED CLOTTING TIME: 222 SECONDS (ref 74–137)
ISTAT ACTIVATED CLOTTING TIME: 262 SECONDS (ref 74–137)
ISTAT ACTIVATED CLOTTING TIME: 274 SECONDS (ref 74–137)
LYMPHOCYTES # BLD AUTO: 0.89 X10(3) UL (ref 1–4)
LYMPHOCYTES # BLD AUTO: 1.15 X10(3) UL (ref 1–4)
LYMPHOCYTES NFR BLD AUTO: 12.6 %
LYMPHOCYTES NFR BLD AUTO: 9.4 %
MAGNESIUM SERPL-MCNC: 1.4 MG/DL (ref 1.6–2.6)
MCH RBC QN AUTO: 32.4 PG (ref 26–34)
MCH RBC QN AUTO: 32.4 PG (ref 26–34)
MCHC RBC AUTO-ENTMCNC: 34 G/DL (ref 31–37)
MCHC RBC AUTO-ENTMCNC: 34.5 G/DL (ref 31–37)
MCV RBC AUTO: 93.9 FL
MCV RBC AUTO: 95.3 FL
MONOCYTES # BLD AUTO: 0.94 X10(3) UL (ref 0.1–1)
MONOCYTES # BLD AUTO: 1.1 X10(3) UL (ref 0.1–1)
MONOCYTES NFR BLD AUTO: 10.3 %
MONOCYTES NFR BLD AUTO: 11.6 %
NEUTROPHILS # BLD AUTO: 6.98 X10 (3) UL (ref 1.5–7.7)
NEUTROPHILS # BLD AUTO: 6.98 X10(3) UL (ref 1.5–7.7)
NEUTROPHILS # BLD AUTO: 7.42 X10 (3) UL (ref 1.5–7.7)
NEUTROPHILS # BLD AUTO: 7.42 X10(3) UL (ref 1.5–7.7)
NEUTROPHILS NFR BLD AUTO: 76.4 %
NEUTROPHILS NFR BLD AUTO: 78.2 %
OSMOLALITY SERPL CALC.SUM OF ELEC: 289 MOSM/KG (ref 275–295)
PCO2 BLD: 29 MMHG
PCO2 BLD: 31.7 MMHG
PCO2 BLD: 32 MMHG
PH BLD: 7.42 [PH]
PH BLD: 7.44 [PH]
PH BLD: 7.46 [PH]
PLATELET # BLD AUTO: 72 10(3)UL (ref 150–450)
PLATELET # BLD AUTO: 87 10(3)UL (ref 150–450)
PLATELETS.RETICULATED NFR BLD AUTO: 7 % (ref 0–7)
PLATELETS.RETICULATED NFR BLD AUTO: 7.1 % (ref 0–7)
PO2 BLD: 207 MMHG
PO2 BLD: 236 MMHG
PO2 BLD: 471 MMHG
POTASSIUM BLD-SCNC: 2.7 MMOL/L (ref 3.6–5.1)
POTASSIUM BLD-SCNC: 2.7 MMOL/L (ref 3.6–5.1)
POTASSIUM BLD-SCNC: 3.6 MMOL/L (ref 3.6–5.1)
POTASSIUM SERPL-SCNC: 4.1 MMOL/L (ref 3.5–5.1)
POTASSIUM SERPL-SCNC: 4.1 MMOL/L (ref 3.5–5.1)
PROT SERPL-MCNC: 6.1 G/DL (ref 5.7–8.2)
PROTHROMBIN TIME: 14.6 SECONDS (ref 11.6–14.8)
RBC # BLD AUTO: 3.27 X10(6)UL
RBC # BLD AUTO: 3.64 X10(6)UL
SAO2 % BLD: 100 %
SODIUM BLD-SCNC: 139 MMOL/L (ref 136–145)
SODIUM BLD-SCNC: 142 MMOL/L (ref 136–145)
SODIUM BLD-SCNC: 142 MMOL/L (ref 136–145)
SODIUM SERPL-SCNC: 139 MMOL/L (ref 136–145)
UNIT VOLUME: 350 ML
WBC # BLD AUTO: 9.1 X10(3) UL (ref 4–11)
WBC # BLD AUTO: 9.5 X10(3) UL (ref 4–11)

## 2024-12-11 PROCEDURE — 74174 CTA ABD&PLVS W/CONTRAST: CPT | Performed by: SURGERY

## 2024-12-11 PROCEDURE — 99233 SBSQ HOSP IP/OBS HIGH 50: CPT | Performed by: INTERNAL MEDICINE

## 2024-12-11 PROCEDURE — 71275 CT ANGIOGRAPHY CHEST: CPT | Performed by: SURGERY

## 2024-12-11 RX ORDER — MAGNESIUM OXIDE 400 MG/1
800 TABLET ORAL ONCE
Status: COMPLETED | OUTPATIENT
Start: 2024-12-11 | End: 2024-12-11

## 2024-12-11 NOTE — SPIRITUAL CARE NOTE
Spiritual Care Visit Note    Patient Name: Sherri Gillette Date of Spiritual Care Visit: 24   : 10/19/1955 Primary Dx: <principal problem not specified>       Referred By: Referral From: Nurse    Spiritual Care Taxonomy:    Intended Effects: Build relationship of care and support    Methods: Collaborate with care team member;Encourage sharing of feelings;Offer emotional support;Offer spiritual/Baptism support    Interventions: Identify supportive relationship(s);Active listening;Acknowledge current situation;Ask guided questions about josé miguel;Ask questions to bring forth feelings    Visit Type/Summary:     - Spiritual Care: Consulted with RN prior to visit. Visited with patient and daughter at bedside. Offered empathic listening and emotional support. Patient expressed she has no spiritual needs at this time. Patient and daughter expressed appreciation for  visit. Provided information regarding how to contact Spiritual Care and left a Spiritual Care information card.  remains available as needed for follow up.    Spiritual Care support can be requested via an Epic consult. For urgent/immediate needs, please contact the On Call  at: [facility]: ext [phone]    Rev. Shalini Velazco MA  Chaplain Resident  [phone]

## 2024-12-11 NOTE — PROGRESS NOTES
Complaining of nausea but no severe distress  Left groin needed femstop overnight but no large hematoma and appears dry   Off vasopressors and bp in goal   Abdomen soft   Excellent signals in feet    Continue bp goal 140-180 for 24 additional hours  Continue central line   Up to chair and ambulate in room   Physical therapy evaluation   CT angiogram today to evaluate repair

## 2024-12-11 NOTE — PHYSICAL THERAPY NOTE
PHYSICAL THERAPY EVALUATION - INPATIENT     Room Number: 453/453-A  Evaluation Date: 12/11/2024  Type of Evaluation: Initial  Physician Order: PT Eval and Treat    Presenting Problem: s/p endovascular abdominal aortic aneurysm stent graft repair with fenestrated device 12/10  Co-Morbidities : descending thoracic aneurysm, PVD, TIA, HTN, depression  Reason for Therapy: Mobility Dysfunction and Discharge Planning    PHYSICAL THERAPY ASSESSMENT   Patient is a 69 year old female admitted 12/10/2024 for planned procedure s/p endovascular abdominal aortic aneurysm stent graft repair with fenestrated device. .  Prior to admission, patient's baseline is mod I with RW or SPC with hx of falls.  Patient is currently functioning below baseline with gait and stair negotiation.  Patient is requiring contact guard assist as a result of the following impairments: pain and decreased muscular endurance.  Physical Therapy will continue to follow for duration of hospitalization.    Patient will benefit from continued skilled PT Services at discharge to promote prior level of function and safety with additional support and return home with home health PT.    PLAN DURING HOSPITALIZATION  Nursing Mobility Recommendation : 1 Assist  PT Device Recommendation: Rolling walker  PT Treatment Plan: Bed mobility;Body mechanics;Coordination;Endurance;Energy conservation;Patient education;Family education;Gait training;Neuromuscular re-educate;Range of motion;Strengthening;Stoop training;Stair training;Transfer training;Balance training  Rehab Potential : Good  Frequency (Obs): 3-5x/week     CURRENT GOALS    Goal #1 Patient is able to demonstrate supine - sit EOB @ level: supervision     Goal #2 Patient is able to demonstrate transfers EOB to/from Chair/Wheelchair at assistance level: supervision     Goal #3 Patient is able to ambulate 150 feet with assist device: walker - rolling at assistance level: supervision     Goal #4 Patient is able to  navigate stairs with rail and SBA   Goal #5    Goal #6    Goal Comments: Goals established on 12/11/2024      PHYSICAL THERAPY MEDICAL/SOCIAL HISTORY  History related to current admission: Patient is a 69 year old female admitted on 12/10/2024: s/p endovascular abdominal aortic aneurysm stent graft repair with fenestrated device.     12/11 vascular surg \"Up to chair and ambulate in room \"    HOME SITUATION  Type of Home: Apartment  Home Layout: One level  Stairs to Enter : 12   Railing: Yes              Lives With: Daughter    Drives: No   Patient Regularly Uses: Glasses;Cane;Rolling walker      Prior Level of [place]: Uses RW and SPC interchangable but always has the SPC on the stairs, had a fall in May broke her femur walking out of her apt, dtr does IADLs (dtr lives in [place] but has been staying with her), another fall reported x2 weeks ago (lost balance on SPC)    SUBJECTIVE  \" I need a RW honey\"     OBJECTIVE  Precautions: Bed/chair alarm (-160)  Fall Risk: Standard fall risk    WEIGHT BEARING RESTRICTION     PAIN ASSESSMENT  Rating: Unable to rate  Location: groin  Management Techniques: Repositioning;Body mechanics    COGNITION  Overall Cognitive Status:  WFL - within functional limits    RANGE OF MOTION AND STRENGTH ASSESSMENT  Upper extremity ROM and strength are within functional limits     Lower extremity ROM is within functional limits     Lower extremity strength is within functional limits     BALANCE  Static Sitting: Good  Dynamic Sitting: Good  Static Standing: Fair -  Dynamic Standing: Fair -    ADDITIONAL TESTS                                    ACTIVITY TOLERANCE           BP: 155/59  BP Location: Left arm  BP Method: Automatic  Patient Position: Sitting    O2 WALK       NEUROLOGICAL FINDINGS                        AM-PAC '6-Clicks' INPATIENT SHORT FORM - BASIC MOBILITY  How much difficulty does the patient currently have...  Patient Difficulty: Turning over in bed (including adjusting  bedclothes, sheets and blankets)?: None   Patient Difficulty: Sitting down on and standing up from a chair with arms (e.g., wheelchair, bedside commode, etc.): A Little   Patient Difficulty: Moving from lying on back to sitting on the side of the bed?: A Little   How much help from another person does the patient currently need...   Help from Another: Moving to and from a bed to a chair (including a wheelchair)?: A Little   Help from Another: Need to walk in hospital room?: A Little   Help from Another: Climbing 3-5 steps with a railing?: A Little     AM-PAC Score:  Raw Score: 19   Approx Degree of Impairment: 41.77%   Standardized Score (AM-PAC Scale): 45.44   CMS Modifier (G-Code): CK    FUNCTIONAL ABILITY STATUS  Gait Assessment   Functional Mobility/Gait Assessment  Gait Assistance: Contact guard assist  Distance (ft): 25  Assistive Device: Rolling walker  Pattern: Shuffle    Skilled Therapy Provided     Bed Mobility:  Rolling: mod I   Supine to sit: mod I    Sit to supine: NT     Transfer Mobility:  Sit to stand: CGA   Stand to sit: CGA  Gait = CGA    Therapist's Comments: Discussed case with RN prior to session initiation. Pt agreeable to participation in therapy. Gait belt donned for out of bed mobility. Educated on body mechanics and lifting precautions. Participated in gait training in room per surg. Tolerated well. Educated on plan for hallway as activity advanced.       Exercise/Education Provided:  Body mechanics  Functional activity tolerated  Gait training    Patient End of Session: Up in chair;With  staff;Call light within reach;Needs met;RN aware of session/findings;All patient questions and concerns addressed;Hospital anti-slip socks;Alarm set      Patient Evaluation Complexity Level:  History Low - no personal factors and/or co-morbidities   Examination of body systems Low -  addressing 1-2 elements   Clinical Presentation Low- Stable   Clinical Decision Making Low Complexity       PT Session  Time: 24 minutes  Gait Training: 10 minutes  Therapeutic Activity:  minutes  Neuromuscular Re-education:  minutes  Therapeutic Exercise:  minutes

## 2024-12-11 NOTE — CM/SW NOTE
12/11/24 1600   CM/SW Referral Data   Referral Source    Reason for Referral Discharge planning   Informant EMR     HOME SITUATION  Type of Home: Apartment  Home Layout: One level  Stairs to Enter : 12   Railing: Yes     Lives With: Daughter    Drives: No   Patient Regularly Uses: Glasses;Cane;Rolling walker       Prior Level of [place]: Uses RW and SPC interchangable but always has the SPC on the stairs, had a fall in May broke her femur walking out of her apt, dtr does IADLs (dtr lives in [place] but has been staying with her), another fall reported x2 weeks ago (lost balance on SPC)    PT=HH. HH referral sent via [employer]. Will follow up with pt for HH choice. Dtr's address is listed on facesheet. Order and F2F complete.    ALEXA Arrieta RN, CM  [phone]

## 2024-12-11 NOTE — PROGRESS NOTES
ICU  Critical Care APRN Progress Note    NAME: Sherri Gillette - ROOM:  CVOR/ CVOR - MRN: [id number] - Age: 69 year old - :10/19/1955    Subjective: Complains of back pain, worse than yesterday. Right groin bleeding has stopped. Fem stopped removed from overnight.         Current Facility-Administered Medications   Medication Dose Route Frequency    aspirin DR tab 81 mg  81 mg Oral Daily    ondansetron (Zofran) 4 MG/2ML injection 4 mg  4 mg Intravenous Q6H PRN    metoclopramide (Reglan) 5 mg/mL injection 10 mg  10 mg Intravenous Q8H PRN    enoxaparin (Lovenox) 30 MG/0.3ML SUBQ injection 30 mg  30 mg Subcutaneous Daily    nitroGLYCERIN in dextrose 5% 50 mg/250mL infusion premix  5-400 mcg/min Intravenous Continuous PRN    niCARdipine in sodium chloride 0.86% (carDENE) 20 mg/200mL infusion premix  5-15 mg/hr Intravenous Continuous PRN    phenylephrine in sodium chloride 0.9% (Thom-Synephrine) 50 mg/250mL infusion premix   mcg/min Intravenous Continuous PRN    norepinephrine (Levophed) 4 mg/250mL infusion premix  0.5-30 mcg/min Intravenous Continuous PRN    HYDROmorphone (Dilaudid) 1 MG/ML injection 0.2 mg  0.2 mg Intravenous Q2H PRN    Or    HYDROmorphone (Dilaudid) 1 MG/ML injection 0.4 mg  0.4 mg Intravenous Q2H PRN    Or    HYDROmorphone (Dilaudid) 1 MG/ML injection 0.8 mg  0.8 mg Intravenous Q2H PRN    HYDROcodone-acetaminophen (Norco)  MG per tab 1 tablet  1 tablet Oral Q6H PRN    Or    HYDROcodone-acetaminophen (Norco)  MG per tab 2 tablet  2 tablet Oral Q6H PRN    docusate sodium (Colace) cap 100 mg  100 mg Oral BID    lactated ringers infusion   Intravenous Continuous     OBJECTIVE  Vitals:  /61   Pulse 97   Temp 97.5 °F (36.4 °C) (Temporal)   Resp 19   Ht 160 cm (5' 3\")   Wt 114 lb 6.7 oz (51.9 kg)   SpO2 96%   BMI 20.27 kg/m²               Physical Exam:    General Appearance: Alert, cooperative, no distress  Neck: No JVD  Lungs: diminished to auscultation bilaterally,  respirations unlabored  Heart: Regular rate and rhythm, S1 and S2 normal, no murmur, rub or gallop  Abdomen: Soft, non-tender, bowel sounds hypoactive  Extremities: Atraumatic, no cyanosis or edema, capillary refill <3 sec.  Bilateral groins with + femoral pulses, dressings intact with no bleeding noted  Pulses: 2+ and symmetric all extremities  Skin: Skin color, texture, turgor normal for ethnicity, no rashes or lesions, warm and dry  Neurologic: normal strength, sensation intact bilateral LEs, denies numbness or tingling    Data this admission:  CTA CHEST CTA ABDOMEN CTA PELVIS (CPT=71275/44894)    Result Date: 12/3/2024  CONCLUSION:   Postoperative changes of repair of prior type A dissection.  No endoleak.  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  Dissection flap extending into the left subclavian artery is unchanged since 1/21/2006.  Dissection flap extending into the right brachiocephalic and bilateral common carotid arteries is new since 1/21/2006.  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     Dictated by (CST): Josh Valencia MD on 12/03/2024 at 2:54 PM     Finalized by (CST): Josh Valencia MD on 12/03/2024 at 3:07 PM          Labs:  Lab Results   Component Value Date    WBC 9.1 12/11/2024    HGB 11.8 12/11/2024    HCT 34.7 12/11/2024    PLT 87.0 12/11/2024    CREATSERUM 0.55 12/11/2024    BUN 9 12/11/2024     12/11/2024    K 4.1 12/11/2024    K 4.1 12/11/2024     12/11/2024    CO2 23.0 12/11/2024     12/11/2024    CA 8.2 12/11/2024    ALB 3.2 12/11/2024    ALKPHO 139 12/11/2024    BILT 1.6 12/11/2024    TP 6.1 12/11/2024    AST 38 12/11/2024    ALT 18 12/11/2024    PTT 30.7 12/11/2024    INR 1.12 12/11/2024    MG 1.4 12/11/2024       Assessment/Plan:    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 with fenestrated device  -ICU monitoring post-op, now  POD1  -ASA, statin  -Pain mgt PRN  -Neurovascular checks Q 1hr  -Bilateral groin sites--some bleeding noted to left groin overnight, fem stop applied, now stopped  -Monitor BP closely via олег--goal 140-160mmHg per vascular  -CTA C/A/P pending to evaluate repair  -Up to chair, ambulate in room  -Vascular surgery following    Thrombocytopenia  -Plts down-trending  -Ok to continue ASA per vascular  -Repeat CBC this afternoon. If stable and no signs of bleeding, continue DVT proph. Will discuss with vascular   -Daily CBC    CAD  -ASA, statin    HTN  -Nicardipine gtt if need for BP control for goal -160mmHg post-op--not requiring       F/E/N:  -IV fluids  -Follow lytes and replete prn    Proph:  -SCD  -lovenox    Dispo:     Code Status: Full Code  -ICU for close monitoring    Plan of care discussed with intensivist, Dr. Bao Powell, [facility]  ICU  Phone  [phone]   Pager [phone]        I agree with APN note above. I have independently seen & evaluated the patient.  I agree with the management plan outlined above with the following changes/additions:     Overall doing well but complaints of back pain; cta to ensure stability of graft. Pending this can start to ambulate.  Keep in icu for monitoring     Elsi Gore M.D.  Pulmonary / Critical Care  [facility] in house Intensivist.

## 2024-12-11 NOTE — PROGRESS NOTES
Received report regarding patient at change of shift. Patient A&O x4 and follows commands. Bleeding at L groin site and bruising noted at change of shift, but site still soft. Bruising outlined and did not spread during shift. R groin site soft with no bleeding. L groin site dressing changed and more bleeding noted - APN notified. Hgb checked and vasc surgery MD notified by APN. Bleeding continued - fem stop applied per vas surgery MD orders - bleeding improved. Throughout the shift all neuro vasc checks were in tact, pulses palpable, skin warm, moves all extremities, denies numbness or tingling (baseline numbness/tingling in feet per unit). Schuster in tact. A-line in tact. SBP parameters followed - cardene and yeni used to follow parameters, but both off by 2155. Please see MAR/flowsheets for further information.

## 2024-12-12 LAB
ALBUMIN SERPL-MCNC: 3.4 G/DL (ref 3.2–4.8)
ALBUMIN/GLOB SERPL: 1.4 {RATIO} (ref 1–2)
ALP LIVER SERPL-CCNC: 135 U/L
ALT SERPL-CCNC: 11 U/L
ANION GAP SERPL CALC-SCNC: 8 MMOL/L (ref 0–18)
AST SERPL-CCNC: 24 U/L (ref ?–34)
ATRIAL RATE: 72 BPM
BASOPHILS # BLD AUTO: 0.03 X10(3) UL (ref 0–0.2)
BASOPHILS NFR BLD AUTO: 0.2 %
BILIRUB SERPL-MCNC: 1.3 MG/DL (ref 0.2–1.1)
BUN BLD-MCNC: 5 MG/DL (ref 9–23)
CALCIUM BLD-MCNC: 8.7 MG/DL (ref 8.7–10.4)
CHLORIDE SERPL-SCNC: 102 MMOL/L (ref 98–112)
CO2 SERPL-SCNC: 28 MMOL/L (ref 21–32)
CREAT BLD-MCNC: 0.45 MG/DL
EGFRCR SERPLBLD CKD-EPI 2021: 104 ML/MIN/1.73M2 (ref 60–?)
EOSINOPHIL # BLD AUTO: 0.02 X10(3) UL (ref 0–0.7)
EOSINOPHIL NFR BLD AUTO: 0.2 %
ERYTHROCYTE [DISTWIDTH] IN BLOOD BY AUTOMATED COUNT: 14.6 %
GLOBULIN PLAS-MCNC: 2.5 G/DL (ref 2–3.5)
GLUCOSE BLD-MCNC: 109 MG/DL (ref 70–99)
HCT VFR BLD AUTO: 31.2 %
HGB BLD-MCNC: 10.9 G/DL
IMM GRANULOCYTES # BLD AUTO: 0.06 X10(3) UL (ref 0–1)
IMM GRANULOCYTES NFR BLD: 0.5 %
LYMPHOCYTES # BLD AUTO: 2.01 X10(3) UL (ref 1–4)
LYMPHOCYTES NFR BLD AUTO: 16.7 %
MAGNESIUM SERPL-MCNC: 1.6 MG/DL (ref 1.6–2.6)
MCH RBC QN AUTO: 32.9 PG (ref 26–34)
MCHC RBC AUTO-ENTMCNC: 34.9 G/DL (ref 31–37)
MCV RBC AUTO: 94.3 FL
MONOCYTES # BLD AUTO: 1.77 X10(3) UL (ref 0.1–1)
MONOCYTES NFR BLD AUTO: 14.7 %
NEUTROPHILS # BLD AUTO: 8.16 X10 (3) UL (ref 1.5–7.7)
NEUTROPHILS # BLD AUTO: 8.16 X10(3) UL (ref 1.5–7.7)
NEUTROPHILS NFR BLD AUTO: 67.7 %
OSMOLALITY SERPL CALC.SUM OF ELEC: 284 MOSM/KG (ref 275–295)
P AXIS: 65 DEGREES
P-R INTERVAL: 158 MS
PLATELET # BLD AUTO: 75 10(3)UL (ref 150–450)
PLATELETS.RETICULATED NFR BLD AUTO: 7.1 % (ref 0–7)
POTASSIUM SERPL-SCNC: 3.5 MMOL/L (ref 3.5–5.1)
PROT SERPL-MCNC: 5.9 G/DL (ref 5.7–8.2)
Q-T INTERVAL: 440 MS
QRS DURATION: 90 MS
QTC CALCULATION (BEZET): 481 MS
R AXIS: -69 DEGREES
RBC # BLD AUTO: 3.31 X10(6)UL
SODIUM SERPL-SCNC: 138 MMOL/L (ref 136–145)
T AXIS: -34 DEGREES
VENTRICULAR RATE: 72 BPM
WBC # BLD AUTO: 12.1 X10(3) UL (ref 4–11)

## 2024-12-12 PROCEDURE — 99233 SBSQ HOSP IP/OBS HIGH 50: CPT | Performed by: INTERNAL MEDICINE

## 2024-12-12 RX ORDER — POTASSIUM CHLORIDE 29.8 MG/ML
40 INJECTION INTRAVENOUS ONCE
Status: COMPLETED | OUTPATIENT
Start: 2024-12-12 | End: 2024-12-12

## 2024-12-12 NOTE — PROGRESS NOTES
DM Hospitalist Progress Note     CC: Hospital Follow up    PCP: Sb Villa DO       Assessment/Plan:     Active Problems:    Abdominal aortic aneurysm (AAA) (HCC)          Patient is a 69 year old female with PMH sig for hypertension, CVA, PAD abdominal aneurysm who presents for elective repair.     Abdominal aortic aneurysm  Status post endovascular repair of abdominal aortic aneurysm, POD 1  Postoperative pain  - IV/p.o. pain medications  - Blood pressure control per vascular surgery  - Hg stable  - CT angiogram to eval for repair - noted,   -Continue aspirin  - Vascular surgery following, recommendations reviewed     Hypertension  - Holding lisinopril and metoprolol for now  - Using PRNs to allow for tighter blood control     Prophylaxis:   DVT with SCDs  Atrophy Prophylaxis ambulate as tolerated  Lines/Monitors:      Dispo: ICU management  Code status:      Patient and/or patient's family given opportunity to ask questions and note understanding and agreeing with therapeutic plan as outlined     Thank you for allowing me to participate in the care of this patient.      Thank You,  Corry Pope DO     [facility] Hospitalist  Pager   Answering Service number: [phone]     Subjective:     No CP, SOB, or N/V. Pain controlled. No overnight events.    OBJECTIVE:    Blood pressure 148/61, pulse 104, temperature 98.9 °F (37.2 °C), temperature source Temporal, resp. rate 22, height 5' 3\" (1.6 m), weight 114 lb 6.7 oz (51.9 kg), SpO2 97%, not currently breastfeeding.    Temp:  [97.5 °F (36.4 °C)-98.9 °F (37.2 °C)] 98.9 °F (37.2 °C)  Pulse:  [] 104  Resp:  [13-25] 22  BP: (136-171)/(48-96) 148/61  SpO2:  [95 %-99 %] 97 %  AO: (135-185)/(55-80) 170/72      Intake/Output:    Intake/Output Summary (Last 24 hours) at 12/12/2024 0033  Last data filed at 12/11/2024 1900  Gross per 24 hour   Intake 3415.1 ml   Output 1055 ml   Net 2360.1 ml       Last 3 Weights   12/11/24 0500 114 lb 6.7 oz (51.9 kg)   12/10/24 0629 105  lb 9.6 oz (47.9 kg)   12/09/24 1609 106 lb (48.1 kg)   09/24/19 1241 128 lb 12.8 oz (58.4 kg)   09/07/18 1317 120 lb 9.6 oz (54.7 kg)       Exam   Gen: No acute distress, alert and oriented x3, no focal neurologic deficits  Heent: NC AT, mucous memb clear, neck supple  Pulm: Lungs clear, normal respiratory effort  CV: Heart with regular rate and rhythm, no peripheral edema  Abd: Abdomen soft, nontender, nondistended, bowel sounds present  MSK: Full range of motion in extremities, no clubbing, no cyanosis ,b/l groin access site without hematoma  Skin: no rashes or lesions  Neuro: AO*3, motor intact, no sensory deficits  Psyc: appropriate mood and affect      Data Review:       Labs:     Recent Labs   Lab 12/10/24  1433 12/10/24  2159 12/11/24  0425 12/11/24  1352   RBC 3.64*  --  3.64* 3.27*   HGB 11.8* 12.8 11.8* 10.6*   HCT 34.6*  --  34.7* 30.7*   MCV 95.1  --  95.3 93.9   MCH 32.4  --  32.4 32.4   MCHC 34.1  --  34.0 34.5   RDW 14.8  --  14.7 14.4   NEPRELIM  --   --  6.98 7.42   WBC 7.4  --  9.1 9.5   .0*  --  87.0* 72.0*         Recent Labs   Lab 12/10/24  1433 12/11/24  0425   * 140*   BUN 11 9   CREATSERUM 0.58 0.55   EGFRCR 98 99   CA 7.6* 8.2*    139   K 3.6 4.1  4.1   * 106   CO2 22.0 23.0       Recent Labs   Lab 12/11/24  0425   ALT 18   AST 38*   ALB 3.2         Imaging:  CTA CHEST CTA ABDOMEN CTA PELVIS (CPT=71275/44329)    Result Date: 12/11/2024  CONCLUSION:  Postsurgical changes of aortic dissection repair.  There is a stent graft in the ascending and descending thoracic aorta which is patent.  Probable endoleak at the descending thoracic aorta level.  Patent stents involving the celiac, SMA and main renal artery origins.  Patent aorto bi-iliac stent grafts.  There is a small dissection flap involving the left distal common femoral artery.  There are dissection flaps extending into the right innominate artery, right common carotid artery, left common carotid and subclavian  arteries.  Symmetric nephrograms.  LOCATION:  [facility]   Dictated by (CST): Lissa Tavares MD on 12/11/2024 at 11:33 AM     Finalized by (CST): Lissa Tavares MD on 12/11/2024 at 11:42 AM       XR CHEST AP PORTABLE  (CPT=71045)    Result Date: 12/10/2024  CONCLUSION:   Postoperative changes of cardiothoracic surgery including placement of aortic stent graft.  Heart size upper limits normal.  Findings of pulmonary venous hypertension with mild interstitial pulmonary edema.  No discrete airspace consolidation.  The pleural spaces are clear.  Right IJ sheath in place with central venous catheter terminating in the mid SVC.   LOCATION:  [id number]      Dictated by (CST): Apurva Machado MD on 12/10/2024 at 3:09 PM     Finalized by (CST): Apurva Machado MD on 12/10/2024 at 3:10 PM          Meds:      aspirin  81 mg Oral Daily    [Held by provider] enoxaparin  30 mg Subcutaneous Daily    docusate sodium  100 mg Oral BID      nitroGLYCERIN in dextrose 5%      niCARdipine Stopped (12/10/24 2058)    phenylephrine 50 mcg/min (12/11/24 2010)    norepinephrine         ondansetron    metoclopramide    nitroGLYCERIN in dextrose 5%    niCARdipine    phenylephrine    norepinephrine    HYDROmorphone **OR** HYDROmorphone **OR** HYDROmorphone    HYDROcodone-acetaminophen **OR** HYDROcodone-acetaminophen

## 2024-12-12 NOTE — PROGRESS NOTES
Doing well  CT scan looks great   Nausea decreased today   Neuro exam intact   Palpable femoral pulses    Continue icu today   Bp goal today 120-180  Can discontinue little  Hold bp meds today

## 2024-12-12 NOTE — PROGRESS NOTES
Pt A0x4. Chronic neuropathy to BLE per pt.  Maintaining 02 sats on RA.  BP labile. Cardene gtt prn (on for short period of time this am). Rt radial олег.  NST on guera.  Bilateral groin sites C/D/I.  Back pain. Prn meds given.  CVC to remain in place today. Schuster removed.  Ambulating to bathroom w/ minimal assist.  Pt updated on POC and agreeable.

## 2024-12-12 NOTE — PROGRESS NOTES
Received report regarding patient at change of shift. Patient A&Ox4 and follows commands. Bilateral fem sites soft, L side minimal bleeding, pulses palpable. Schuster in tact. PRN pain meds given as ordered. Thom restarted to maintain BP parameters of SBP between 140-180, but turned off at 0350. Please see MAR/flowsheets for further data.

## 2024-12-12 NOTE — PROGRESS NOTES
Assumed care of patient at the change of shift. Pt A&Ox4. RA. Complaints of back pain- prn medication given. Pt having some nausea- see eMAR. Schuster intact. Central line dressing changed- quick clot placed. Pt remained off gtt. Up to chair with PT. Bilateral fem sites soft. Left side had minimal bleeding. Pedal pulses intact. Daughter at bedside. Daughter and patient informed of plan of care. Care endorsed to night shift RN.

## 2024-12-12 NOTE — CM/SW NOTE
Met with pt to provide HH list. Pt surprised that she would be discharge to home. Discussed levels of care and insurance criteria. Spoke with daughter Karissa by phone who will look at HH list and leslie which one she is interested in. Karissa confirming pt will be discharged to home and verbalizes agreement. Karissa is currently staying with pt.     Needs F/U for HH choice.     Surinder Mckeon, ALEXA RN, CM  X [phone]    yes

## 2024-12-12 NOTE — PROGRESS NOTES
ICU  Critical Care APRN Progress Note    NAME: Sherri Gillette - ROOM:  CVOR/ CVOR - MRN: [id number] - Age: 69 year old - :10/19/1955    Subjective:  Was on neosynephrine gtt overnight. Now on cardene gtt. BP very labile. Groin sites with active bleeding.         Current Facility-Administered Medications   Medication Dose Route Frequency    aspirin DR tab 81 mg  81 mg Oral Daily    ondansetron (Zofran) 4 MG/2ML injection 4 mg  4 mg Intravenous Q6H PRN    metoclopramide (Reglan) 5 mg/mL injection 10 mg  10 mg Intravenous Q8H PRN    [Held by provider] enoxaparin (Lovenox) 30 MG/0.3ML SUBQ injection 30 mg  30 mg Subcutaneous Daily    nitroGLYCERIN in dextrose 5% 50 mg/250mL infusion premix  5-400 mcg/min Intravenous Continuous PRN    niCARdipine in sodium chloride 0.86% (carDENE) 20 mg/200mL infusion premix  5-15 mg/hr Intravenous Continuous PRN    phenylephrine in sodium chloride 0.9% (Thom-Synephrine) 50 mg/250mL infusion premix   mcg/min Intravenous Continuous PRN    norepinephrine (Levophed) 4 mg/250mL infusion premix  0.5-30 mcg/min Intravenous Continuous PRN    HYDROmorphone (Dilaudid) 1 MG/ML injection 0.2 mg  0.2 mg Intravenous Q2H PRN    Or    HYDROmorphone (Dilaudid) 1 MG/ML injection 0.4 mg  0.4 mg Intravenous Q2H PRN    Or    HYDROmorphone (Dilaudid) 1 MG/ML injection 0.8 mg  0.8 mg Intravenous Q2H PRN    HYDROcodone-acetaminophen (Norco)  MG per tab 1 tablet  1 tablet Oral Q6H PRN    Or    HYDROcodone-acetaminophen (Norco)  MG per tab 2 tablet  2 tablet Oral Q6H PRN    docusate sodium (Colace) cap 100 mg  100 mg Oral BID     OBJECTIVE  Vitals:  /67   Pulse 98   Temp 99.2 °F (37.3 °C) (Temporal)   Resp 20   Ht 160 cm (5' 3\")   Wt 113 lb 12.1 oz (51.6 kg)   SpO2 98%   BMI 20.15 kg/m²               Physical Exam:    General Appearance: Alert, cooperative, no distress  Neck: No JVD  Lungs: diminished to auscultation bilaterally, respirations unlabored  Heart: Regular rate  and rhythm, S1 and S2 normal, no murmur, rub or gallop  Abdomen: Soft, non-tender, bowel sounds hypoactive  Extremities: Atraumatic, no cyanosis or edema, capillary refill <3 sec.  Bilateral groins with + femoral pulses, dressings intact with no bleeding noted  Pulses: 2+ and symmetric all extremities  Skin: Skin color, texture, turgor normal for ethnicity, no rashes or lesions, warm and dry  Neurologic: normal strength, sensation intact bilateral LEs, denies numbness or tingling    Data this admission:  CTA CHEST CTA ABDOMEN CTA PELVIS (CPT=71275/42147)    Result Date: 12/3/2024  CONCLUSION:   Postoperative changes of repair of prior type A dissection.  No endoleak.  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  Dissection flap extending into the left subclavian artery is unchanged since 1/21/2006.  Dissection flap extending into the right brachiocephalic and bilateral common carotid arteries is new since 1/21/2006.  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     Dictated by (CST): Josh Valencia MD on 12/03/2024 at 2:54 PM     Finalized by (CST): Josh Valencia MD on 12/03/2024 at 3:07 PM          Labs:  Lab Results   Component Value Date    WBC 12.1 12/12/2024    HGB 10.9 12/12/2024    HCT 31.2 12/12/2024    PLT 75.0 12/12/2024    CREATSERUM 0.45 12/12/2024    BUN 5 12/12/2024     12/12/2024    K 3.5 12/12/2024     12/12/2024    CO2 28.0 12/12/2024     12/12/2024    CA 8.7 12/12/2024    ALB 3.4 12/12/2024    ALKPHO 135 12/12/2024    BILT 1.3 12/12/2024    TP 5.9 12/12/2024    AST 24 12/12/2024    ALT 11 12/12/2024    MG 1.6 12/12/2024       Assessment/Plan:    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 with fenestrated device  -ICU monitoring post-op, now POD1  -ASA, statin  -Pain mgt PRN  -Neurovascular checks Q 4hr  -Bilateral groin sites--no further signs of  bleeding to right groin, fem stop removed  -Monitor BP closely via олег--goal 120-180mmHg per vascular--required neosynephrine gtt overnight, now off and on cardene gtt  -CTA C/A/P with repair stable  -Vascular surgery following    Thrombocytopenia  -Plts low, but stable  -Ok to continue ASA per vascular, holding DVT proph  -Daily CBC    CAD  -ASA, statin    HTN  -Nicardipine gtt if need for BP control for goal -180mmHg -currently on low dose cardene gtt  -monitor on tele  -Vascular surgery managing BP meds      F/E/N:  -IV fluids  -Follow lytes and replete prn    Proph:  -SCD  -lovenox held per vascular    Dispo:     Code Status: Full Code  -ICU for close monitoring    Plan of care discussed with intensivist, Dr. Yanet Powell, [facility]  ICU  Phone  [phone]   Pager [phone]

## 2024-12-13 LAB
ALBUMIN SERPL-MCNC: 3.2 G/DL (ref 3.2–4.8)
ALBUMIN/GLOB SERPL: 1.2 {RATIO} (ref 1–2)
ALP LIVER SERPL-CCNC: 118 U/L
ALT SERPL-CCNC: 13 U/L
ANION GAP SERPL CALC-SCNC: 6 MMOL/L (ref 0–18)
AST SERPL-CCNC: 21 U/L (ref ?–34)
BASOPHILS # BLD AUTO: 0.03 X10(3) UL (ref 0–0.2)
BASOPHILS NFR BLD AUTO: 0.4 %
BILIRUB SERPL-MCNC: 1.6 MG/DL (ref 0.2–1.1)
BUN BLD-MCNC: 8 MG/DL (ref 9–23)
CALCIUM BLD-MCNC: 8.7 MG/DL (ref 8.7–10.4)
CHLORIDE SERPL-SCNC: 104 MMOL/L (ref 98–112)
CO2 SERPL-SCNC: 26 MMOL/L (ref 21–32)
CREAT BLD-MCNC: 0.41 MG/DL
EGFRCR SERPLBLD CKD-EPI 2021: 106 ML/MIN/1.73M2 (ref 60–?)
EOSINOPHIL # BLD AUTO: 0.07 X10(3) UL (ref 0–0.7)
EOSINOPHIL NFR BLD AUTO: 0.8 %
ERYTHROCYTE [DISTWIDTH] IN BLOOD BY AUTOMATED COUNT: 14 %
GLOBULIN PLAS-MCNC: 2.7 G/DL (ref 2–3.5)
GLUCOSE BLD-MCNC: 96 MG/DL (ref 70–99)
HCT VFR BLD AUTO: 30.7 %
HGB BLD-MCNC: 10.4 G/DL
IMM GRANULOCYTES # BLD AUTO: 0.05 X10(3) UL (ref 0–1)
IMM GRANULOCYTES NFR BLD: 0.6 %
LYMPHOCYTES # BLD AUTO: 1.75 X10(3) UL (ref 1–4)
LYMPHOCYTES NFR BLD AUTO: 20.5 %
MAGNESIUM SERPL-MCNC: 1.8 MG/DL (ref 1.6–2.6)
MCH RBC QN AUTO: 32.1 PG (ref 26–34)
MCHC RBC AUTO-ENTMCNC: 33.9 G/DL (ref 31–37)
MCV RBC AUTO: 94.8 FL
MONOCYTES # BLD AUTO: 1.19 X10(3) UL (ref 0.1–1)
MONOCYTES NFR BLD AUTO: 13.9 %
NEUTROPHILS # BLD AUTO: 5.45 X10 (3) UL (ref 1.5–7.7)
NEUTROPHILS # BLD AUTO: 5.45 X10(3) UL (ref 1.5–7.7)
NEUTROPHILS NFR BLD AUTO: 63.8 %
OSMOLALITY SERPL CALC.SUM OF ELEC: 280 MOSM/KG (ref 275–295)
PLATELET # BLD AUTO: 73 10(3)UL (ref 150–450)
PLATELETS.RETICULATED NFR BLD AUTO: 9 % (ref 0–7)
POTASSIUM SERPL-SCNC: 3.4 MMOL/L (ref 3.5–5.1)
POTASSIUM SERPL-SCNC: 3.4 MMOL/L (ref 3.5–5.1)
PROT SERPL-MCNC: 5.9 G/DL (ref 5.7–8.2)
RBC # BLD AUTO: 3.24 X10(6)UL
SODIUM SERPL-SCNC: 136 MMOL/L (ref 136–145)
WBC # BLD AUTO: 8.5 X10(3) UL (ref 4–11)

## 2024-12-13 PROCEDURE — 99232 SBSQ HOSP IP/OBS MODERATE 35: CPT | Performed by: INTERNAL MEDICINE

## 2024-12-13 RX ORDER — MAGNESIUM OXIDE 400 MG/1
400 TABLET ORAL ONCE
Status: COMPLETED | OUTPATIENT
Start: 2024-12-13 | End: 2024-12-13

## 2024-12-13 RX ORDER — POTASSIUM CHLORIDE 29.8 MG/ML
40 INJECTION INTRAVENOUS ONCE
Status: COMPLETED | OUTPATIENT
Start: 2024-12-13 | End: 2024-12-13

## 2024-12-13 RX ORDER — DOCUSATE SODIUM 100 MG/1
100 CAPSULE, LIQUID FILLED ORAL 2 TIMES DAILY
Status: DISCONTINUED | OUTPATIENT
Start: 2024-12-13 | End: 2024-12-13

## 2024-12-13 RX ORDER — POLYETHYLENE GLYCOL 3350 17 G/17G
17 POWDER, FOR SOLUTION ORAL DAILY
Status: DISCONTINUED | OUTPATIENT
Start: 2024-12-13 | End: 2024-12-16

## 2024-12-13 NOTE — PLAN OF CARE
Received pt at change of shift. Pt alert and oriented x4 on room air. Pt with some moderate back pain this afternoon. Treated with PRN pain medication. See MAR. A-line removed. Central line removed. Transfer orders placed. Pt worked with PT/OT this evening walking in the hallways with walker. Will continue with plan of care.

## 2024-12-13 NOTE — PROGRESS NOTES
[facility] Hospitalist Progress Note     CC: Hospital Follow up    PCP: Sb Villa DO       Assessment/Plan:     Active Problems:    Abdominal aortic aneurysm (AAA) (HCC)          Patient is a 69 year old female with PMH sig for hypertension, CVA, PAD abdominal aneurysm who presents for elective repair.     Abdominal aortic aneurysm  Status post endovascular repair of abdominal aortic aneurysm 12/10  Postoperative pain  - asa, statin  - vascular following, state that f/u CT scan was okay     Hypertension  - Holding lisinopril and metoprolol for now  - Using PRNs to allow for tighter blood control    Thrombocytopenia- plts stable in 70s for now       Prophylaxis:   DVT with SCDs  Atrophy Prophylaxis ambulate as tolerated  Lines/Monitors:      Agree with transfer to tele floor when able.       Subjective:     Ambulated, worked on stairs. No sob    OBJECTIVE:    Blood pressure 134/83, pulse 89, temperature 98.6 °F (37 °C), temperature source Temporal, resp. rate 20, height 5' 3\" (1.6 m), weight 113 lb 12.1 oz (51.6 kg), SpO2 98%, not currently breastfeeding.    Temp:  [97.7 °F (36.5 °C)-99.7 °F (37.6 °C)] 98.6 °F (37 °C)  Pulse:  [] 89  Resp:  [13-25] 20  BP: (124-172)/(49-83) 134/83  SpO2:  [86 %-100 %] 98 %  AO: (123-181)/(52-85) 181/71      Intake/Output:    Intake/Output Summary (Last 24 hours) at 12/13/2024 1639  Last data filed at 12/13/2024 1124  Gross per 24 hour   Intake 361 ml   Output --   Net 361 ml       Last 3 Weights   12/12/24 0613 113 lb 12.1 oz (51.6 kg)   12/11/24 0500 114 lb 6.7 oz (51.9 kg)   12/10/24 0629 105 lb 9.6 oz (47.9 kg)   12/09/24 1609 106 lb (48.1 kg)   09/24/19 1241 128 lb 12.8 oz (58.4 kg)   09/07/18 1317 120 lb 9.6 oz (54.7 kg)       Exam   Gen: No acute distress, alert and oriented x3, no focal neurologic deficits  Heent: NC AT, mucous memb clear, neck supple  Pulm: Lungs clear, normal respiratory effort  CV: Heart with regular rate and rhythm, no peripheral edema  Abd: Abdomen  soft, nontender, nondistended, bowel sounds present  Skin: no rashes or lesions  Neuro: AO*3, motor intact, no sensory deficits  Psyc: appropriate mood and affect      Data Review:       Labs:     Recent Labs   Lab 12/11/24  1352 12/12/24  0419 12/13/24  0511   RBC 3.27* 3.31* 3.24*   HGB 10.6* 10.9* 10.4*   HCT 30.7* 31.2* 30.7*   MCV 93.9 94.3 94.8   MCH 32.4 32.9 32.1   MCHC 34.5 34.9 33.9   RDW 14.4 14.6 14.0   NEPRELIM 7.42 8.16* 5.45   WBC 9.5 12.1* 8.5   PLT 72.0* 75.0* 73.0*         Recent Labs   Lab 12/11/24  0425 12/12/24  0419 12/13/24  0511   * 109* 96   BUN 9 5* 8*   CREATSERUM 0.55 0.45* 0.41*   EGFRCR 99 104 106   CA 8.2* 8.7 8.7    138 136   K 4.1  4.1 3.5 3.4*  3.4*    102 104   CO2 23.0 28.0 26.0       Recent Labs   Lab 12/11/24  0425 12/12/24  0419 12/13/24  0511   ALT 18 11 13   AST 38* 24 21   ALB 3.2 3.4 3.2         Imaging:  CTA CHEST CTA ABDOMEN CTA PELVIS (CPT=71275/25494)    Result Date: 12/11/2024  CONCLUSION:  Postsurgical changes of aortic dissection repair.  There is a stent graft in the ascending and descending thoracic aorta which is patent.  Probable endoleak at the descending thoracic aorta level.  Patent stents involving the celiac, SMA and main renal artery origins.  Patent aorto bi-iliac stent grafts.  There is a small dissection flap involving the left distal common femoral artery.  There are dissection flaps extending into the right innominate artery, right common carotid artery, left common carotid and subclavian arteries.  Symmetric nephrograms.  LOCATION:  [facility]   Dictated by (CST): Lissa Tavares MD on 12/11/2024 at 11:33 AM     Finalized by (CST): Lissa Tavares MD on 12/11/2024 at 11:42 AM          Meds:      polyethylene glycol (PEG 3350)  17 g Oral Daily    aspirin  81 mg Oral Daily    [Held by provider] enoxaparin  30 mg Subcutaneous Daily    docusate sodium  100 mg Oral BID           ondansetron    metoclopramide    HYDROmorphone **OR**  HYDROmorphone **OR** HYDROmorphone    HYDROcodone-acetaminophen **OR** HYDROcodone-acetaminophen

## 2024-12-13 NOTE — OPERATIVE REPORT
Vascular Surgery Op Note  Patients Name:    Sherri Gillette    Operating Physician: Hamlet Hoffman MD  CSN:    [id number]                                                           Location:  OR  MRN:     [id number]                                            YOB: 1955    Operation Date:     12/10/2024         Pre-Operative Diagnosis:   1.  Thoracoabdominal aortic aneurysm with dissection     Post-Operative Diagnosis:   1.  Thoracoabdominal aortic aneurysm with dissection        Procedure Performed:   1.  Ultrasound-guided access of the bilateral common femoral arteries  2. Introduction of catheter into aorta  3.  Thoracic aortogram and abdominal aortogram with radiologic supervision and interpretation  4.  Intravascular ultrasound of the right common iliac artery, right external iliac artery, left external iliac artery, left common iliac artery, and aorta with radiologic supervision and interpretation  5.  Thoracic endovascular aortic repair delayed after initial placement with proximal extension-Cook alpha 40 x 36 x 209 thoracic stent graft  6.  Thoracic endovascular aortic repair delayed after initial placement with distal extension-Cook alpha 40 x 167 thoracic stent graft  7.  Fenestrated endovascular aortic repair with 4 fenestrations CPT 41819  Cook Zenith flex 36 x 131  Celiac 7 x 38 iCast  SMA 7 x 38 iCast  Left renal 7x22 iCast  Right renal 6x22 iCast  Left ipsilateral 20 x 74 iliac extension  Right contralateral 13 x 122 iliac extension  8.  Left external iliac artery angioplasty and stenting with 10 x 80 protégé stent postdilated with 8 mm balloon  9.  Closure of bilateral groin with Pro-glide Perclose suture (20 Cymro right, 22 Cymro left).         Co-Surgeons:  MD Moe Aquino MD    Due to the complexity of the operation given the extent of the thoracoabdominal dissection, need for  thermal septotomy and fenestrated repair I asked my partner to participate as a  co-surgeon for this operation.    Anesthesia:   General       EBL: 200 cc     Complications: None    Drains: None     Findings: Successful septotomy performed.  Graft relined above.  Fenestrated repair performed with wide patency of all visceral's and exclusion of the dissection and aneurysm.  Palpable pulses at completion.  Neurovascularly intact at completion.     Indications for procedure: This is a 69-year-old female who presented with thoracoabdominal aortic aneurysm with dissection.  She underwent previous ascending repair with the branching and subsequent thoracic endovascular aortic repair back in 2016.  She was found recently to have growth of the aneurysm from 5.2 cm to 7.5 cm since this past May.  I informed her that there is no stent commercially available.  She was too frail for open surgery and her options were nonoperative management versus a fenestrated endovascular aortic repair.  I discussed the risks and benefits of the procedure not limited to the risk of bleeding, infection, stroke, MI, rupture, spinal cord ischemia and death.  Informed consent was directly obtained.  On the morning of December 10 the patient was brought to the operating room and placed in supine position.  General anesthesia was induced without any issues.  A Schuster catheter and arterial line were placed.  Neuromonitoring was initiated.  Antibiotics were administered.  The patient was subsequently prepped and draped in the usual sterile fashion.  Time was performed.    With the use the ultrasound I accessed the right common femoral with advancement of a microwire and exchanged for a micro sheath.  A Glidewire advantage was then put in place followed by a 6 Malaysian sheath.  I then deployed Pro-glide Perclose sutures and the 10 and 2 o'clock position followed by placement of an 8 Malaysian sheath. I performed this on the right and Dr. Camargo performed this on the left.    I then performed intravascular ultrasound through the right  external iliac, right common iliac and aorta and confirmed placement into the true lumen all the way to the thoracic aorta.  Similarly Dr. Camargo warmed intravascular ultrasound in the left external iliac, left common iliac and aorta and confirmed entry into the true lumen.  We then upsized our she is to a 20 Croatian sheath on the right by me and 22 Croatian sheath on the left by him.  I then placed a piyush wire in the right groin and advanced a Krista 4 catheter and Glidewire into the false lumen and directed this into the descending thoracic aorta just above the previous existing stent.  I then engaged the fenestration in the septum and then placed the wire into the true lumen and was able to externalize this into the left sheath.  We then exchanged for a Arreguin cross catheter and then placed a shaped Astato wire with stripping of the coating.  Catheters were placed by me on the right followed by Dr. Camargo on the left insulate the catheter with only exposure of the uncoated segment.  A thermal septotomy was performed from the descending thoracic aorta all the way to the aortic bifurcation and this was performed without any issues.  Intravascular ultrasound that was repeated confirmed dramatic improvement of flow in the true lumen and this was performed in order to allow expansion of the graft as well as communication between the true lumen and the left renal artery.  At this point we opted to proceed with the remainder of the repair.    From the right groin I advanced the proximal thoracic stent graft to exclude the old graft and deployed a 40 x 36 alpha thoracic stent graft proximally with proximal extension and deployed this without any issues.    I then advanced a the fenestrated device from the left. Dr. Camargo then on sheath this with exposure of the gate.  I cannulated the great from the right and advanced the 20 Croatian sheath into the main body of the graft.  I then utilized a Krista 4 catheter and Glidewire to  engage the fenestration and select the superior mesenteric artery with advancement of the catheter into the target vessel and confirmed this with contrast injection.  I placed a Matias wire in this.  I performed the similarly in the right renal.  He then performed this on the left renal and the celiac.  A molding and occluding aortic balloon was then used to pop the diameter reducing ties thereby fully deploying the graft.  I placed 6 Faroese sheath in the bilateral renal arteries with placement of 6 x 22 iCast on the right and left with 7 x 22 iCast.  He then deployed the remainder of the graft on the left and then exchanged for a 40 mm alpha thoracic stent graft and deployed this as a bridge piece with distal extension to just above the celiac artery without any issues.  A molding and occluding aortic balloon was used to profile the proximal and distal segments.  I advanced this and then he inflated the balloon to profile the entirety of this.    I then proceeded with the visceral stenting.  I pulled the sheath back in the left renal and deployed a 7 x 22 iCast without any issues.  I then flared this with a 9 mm balloon.  Repeat angiogram revealed wide patency with no leak.  The wire and catheter were removed.  He then withdrew the sheath in the right renal with exposure of the sheath and deployed this without any issues.  This was then flared with a 9 mm balloon and repeat angiogram revealed wide patency with no kink and no leak the wire and catheter were then removed.  Sheath was then placed in the SMA followed by a 7 x 22 iCast.  I deployed this inflated this without any issues.  Repeat angiogram revealed inadequate purchase and thus a 7 x 38 iCast was deployed and extended without any issues.  This was flared with 9 mm balloon.  Repeat angiogram revealed wide patency with no endoleak.  The wire and catheter removed.  Similarly he placed the 7 Faroese sheath in the celiac and a 7 x 38 iCast which was deployed  without any issues and flared with 9 mm balloon.  Repeat angiogram revealed wide patency with no leak and subsequently removed.  I then deployed a 13mm right iliac limb all the way to the distal common iliac artery without any issues. Dr. Camargo performed the iliac extension on the left with 20 mm extension to the distal left common iliac without any issues.  A molding occluding aortic balloon was used to profile the proximal distal and overlapping segments.  A 10 mm protégé stent was deployed by Dr. Camargo in the left external iliac due to residual dissection and postdilated with 8 mm balloon.  Repeat angiogram revealed wide patency with no endoleak and flow into all visceral vessels.  At this point we are satisfied.  We confirmed under intravascular ultrasound complete resolution and an adequate graft expansion.  At this point we opted to terminate the procedure.  All catheters were withdrawn.  We then the deployed the Pro-glide Perclose sutures which were cinched locked and cut thereby closing the arteriotomy site.  Manual pressure was held.  Protamine was administered.  Upon completion there was evidence of excellent hemostasis and a sterile dressing was applied.  The patient awoke from general anesthesia was extubated and taken to the ICU in stable condition.  At completion he was noted to have palpable pulses and was found to be neurovascularly intact.         Hamlet Hoffman MD

## 2024-12-13 NOTE — PROGRESS NOTES
AMBULATORY PROGRESS NOTE Patient: Sulma Newman             MRN: [id number]     SSN: [id number] Body mass index is 38.62 kg/(m^2). YOB: 1954     AGE: 59 y.o. EX: female PCP: Paddy Steele MD 
 
IMPRESSION/DIAGNOSIS AND TREATMENT PLAN  
 
DIAGNOSES 1. Peroneal tendinitis of right lower extremity 2. Closed nondisplaced fracture of second metatarsal bone of right foot with delayed healing, subsequent encounter Orders Placed This Encounter  AMB SUPPLY ORDER  
 METABOLIC PANEL, COMPREHENSIVE  VITAMIN D, 25 HYROXY PANEL Sulma Newman understands her diagnoses and the proposed plan. Plan: 
 
1) Bone Stimulator for 2nd met base Fx: nonunion 2) Obtain the Brace as directed. 3) Continue activity modification as directed. 4) Lab Tests: CMP and Vitamin D levels. 5) Work Note: Please allow her to return to work on limited standing or walking. Please allow her to work as a  if available. RTO - 5 weeks HPI AND EXAMINATION Sulma Nweman IS A 59 y.o. female who presents to my outpatient office for follow up of a closed nondisplaced fracture of the second metatarsal bone of the right foot, peroneal tendinitis of the RLE. At last visit, Ramirez Roberson PA-C, ordered a MRI of the right ankle, prescribed Voltaren 1% gel, provided Tubigrip for swelling, ordered a custom orthotic for the RLE, and recommended to continue work restrictions. Since we saw her last, Ms. Kilo Trent reports she continues to experience pain along her lateral right ankle and along her right great toe. Patient denies h/o DM. She has been wearing a CAM walker boot as directed by Ramirez Roberson PA-C. She has seen the local orthotist yesterday who molded her right foot for a SMO or AFO brace. At this time, a bone stimulator will be ordered since her injury was over 90 days ago.  Additionally, she will follow up with the orthotist to have the brace made and will follow Received report regarding patient at change of shift. Patient A&O x4 and follows commands. Bilateral fem sites soft. Pulses palpable. Patient able to ambulate to bathroom with standby assist and walker. PRN pain meds given as ordered. Thom briefly restarted to maintain BP parameters of -180. Please see MAR/flowsheets for further data.   up at my office 2 weeks after obtaining the brace. Patient denies smoking. Right ANKLE and FOOT    
   
Gait: slow Tenderness: moderate tenderness across 2nd metatarsal head, 5th metatarsal base, peroneal tendons, midfoot and hindfoot near Achilles tendon insertion. No TTP along posterior tibial tendon Cutaneous: No rashes, skin patches, wounds, or abrasions to the lower legs                               Warm and Normal color. No regions of expressible drainage. 
                              Medial Border of Tibia Region: absent 
                              Skin color, texture, turgor normal. Normal. 
                              Mild swelling from forefoot to midfoot. Joint Motion: ROM Ankle:Normal , Hindfoot: (ST,TN,CC Normal}, Forefoot toes:Decreased Neurologic Exam: Neuro: Motor: normal 5/5 strength in all tested muscle groups and Sensory : no sensory deficits noted. Contractures: Gastrocnemius or Achilles Contractures absent Joint / Tendon Stability: No Ankle or Subtalar instability or joint laxity.                                                               No peroneal sublux ability or dislocation Alignment: Slight Pes Planus Vascular: Normal Pulses/ NL Capillary refill, No evidence of DVT seen on physical exam. 
                      No calf swelling, no tenderness to calf muscles. Lymphatic:  No Evidence of Lymphedema. 
  
CHART REVIEW Past Medical History:  
Diagnosis Date  Asthma Dr. Billy Mcclain  [facility] Lung Cancer s/p right lobectomy Dr Angeline Cadena 10/06  Chronic obstructive pulmonary disease ([facility]   
 and right lung nodules, stable on serial CT last 6/14  Colon adenoma 2009 Dr. Cheli Saldana; adenoma and diverticulosis 9/12 Dr. Clair Ross; 12/26/17  FHx: breast cancer  Glaucoma Dr Garry Myers  Hyperlipidemia 5/14/2018  Hypovitaminosis D 2011  IFG (impaired fasting glucose)  Morbid obesity ([facility]  peak weight 209 lbs, bmi 37 from 2/14  PUD (peptic ulcer disease)   
 h pylori tx'ed  Sleep apnea   
 on CPAP, Dr. Ila Yepez 2010  Venous insufficiency Current Outpatient Prescriptions Medication Sig  
 diclofenac (VOLTAREN) 1 % gel Apply  to affected area four (4) times daily. USE AS DIRECTED  ergocalciferol (VITAMIN D2) 50,000 unit capsule Take 1 Cap by mouth every seven (7) days.  potassium chloride (K-DUR, KLOR-CON) 10 mEq tablet Take 1 Tab by mouth daily.  magnesium oxide (MAG-OX) 400 mg tablet Take 400 mg by mouth daily.  ascorbic acid (VITAMIN C) 250 mg tablet Take  by mouth.  beclomethasone (QVAR) 80 mcg/actuation inhaler Take 1 Puff by inhalation two (2) times a day.  albuterol (PROVENTIL HFA, VENTOLIN HFA) 90 mcg/actuation inhaler Take 2 Puffs by inhalation every four (4) hours as needed.  multivitamin (ONE A DAY) tablet Take 1 Tab by mouth daily.  lidocaine-EPINEPHrine (XYLOCAINE) 1 %-1:100,000 injection 10 mL by IntraDERMal route once for 1 dose. Indications: ADMINISTRATION OF LOCAL ANESTHESIA No current facility-administered medications for this visit. Allergies Allergen Reactions  Advair Diskus [Fluticasone-Salmeterol] Other (comments) Hoarseness  Naproxen Unable to Obtain  Pcn [Penicillins] Rash Past Surgical History:  
Procedure Laterality Date  CARDIAC SURG PROCEDURE UNLIST  11/07  
 thallium negative  CARDIAC SURG PROCEDURE UNLIST  12/2017  
 echo showed nl lv, ef 65%, gr 1 dd; Dr Agapito Durand  CARDIAC SURG PROCEDURE UNLIST  09/2016  
 ex stress echo without clear wma Dr Keven Wells  CT HEAD W CONT  11/07  
 negative  EGD    
 PUD on EGD, H pylori +txed 2009.  HX APPENDECTOMY  1980s  HX CATARACT REMOVAL Dr Jose Torres  HX COLONOSCOPY Dr. San July 2009 adenoma; Dr Bonny Hedrick 9/12  adenoma and tics; 12/26/17 sessile serrated and tubular adenomas and tics  HX HEENT    
 thyroid US negative 6/14 [address]  
 for fibroids  HX LOBECTOMY for cancer, Dr. Arianne Parsons 10/06. No adjuvant tx. She has another spot on the same side that is being followed every three months.  HX ORTHOPAEDIC Left foot ([place]) and knee surgery x2 Dr. Mary Jo Billings. Right foot fx 4/18 Dr Alvy Blizzard [address]  6/11  
 negative Social History Occupational History  [employer]  Social History Main Topics  Smoking status: Former Smoker Packs/day: 0.50 Years: 20.00 Types: Cigarettes Quit date: 1/1/1990  Smokeless tobacco: Never Used  Alcohol use No  
 Drug use: No  
 Sexual activity: Not Currently Family History Problem Relation Age of Onset  Breast Cancer Mother  Other Brother SLE  
 Other Brother SLE  
 Other Brother SLE  Hypertension Father REVIEW OF SYSTEMS : 8/1/2018  ALL BELOW ARE Negative except : SEE HPI Constitutional: Negative for fever, chills and weight loss. Neg Weigh Loss Cardiovascular: Negative for chest pain, claudication and leg swelling. SOB, RAHMAN Gastrointestinal: Negative for  pain, N/V/D/C, Blood in stool or urine,dysuria, hematuria, Incontinence, pelvic pain Musculoskeletal: see HPI. Neurological: Negative for dizziness and weakness. Negative for headaches,Visual Changes, Confusion, Seizures, Psychiatric/Behavioral: Negative for depression, memory loss and substance abuse. Extremities:  Negative for  hair changes, rash or skin lesion changes. Hematologic: Negative for Bleeding problems, bruising, pallor or swollen lymph nodes. Peripheral Vascular: No calf pain, vascular vein tenderness to calf pain No calf throbbing, posterior knee throbbing pain DIAGNOSTIC IMAGING No notes on file MRI Results (most recent): 
 
Results from Hospital Encounter encounter on 07/27/18 MRI ANKLE RT WO CONT  Narrative Description:  MRI right ankle without contrast 
 
TECHNIQUE: Multiplanar multisequence MR imaging of the right ankle without 
contrast 
 
Clinical Indication:  Persistent lateral ankle pain and swelling. History of 
closed fracture of the right foot and leg 2017. Comparison: MRI of the right foot, April 26, 2018. Findings: The ankle mortise is preserved and the talar dome is smooth. There is mild marrow edema at the distal aspect of the lateral malleolus. The anterior tibiofibular ligament appears intact. The anterior talofibular ligament appears intact. The posterior talofibular ligament appears intact. There is some thickening of the calcaneofibular ligament (axial, 15). The superficial and deep fibers of the deltoid ligament appear intact. The extensor tendons appear intact. There is a small amount of fluid around the posterior tibialis tendon. The 
flexor digitorum longus and flexor hallucis longus tendons appear unremarkable. There is a marker placed over the lateral left ankle indicating the area of 
discomfort. The marker coincides with the lateral malleolus where there is a 
small amount of edema. The peroneus longus and brevis tendons are also passing 
in the region of the marker. There may be mild tendinosis and trace 
tenosynovitis. The Achilles tendon appears unremarkable. There is normal signal intensity within Kager's triangle. There is bulky plantar enthesopathy. There may be mild inflammation at the 
attachment of the medial band of the plantar aponeurosis. The sinus Tarsi has a normal appearance. There is no tibiotalar joint effusion. There is reduced edema within the visualized proximal metatarsal shaft likely 
associated with partial healing of previously described fracture. The tarsal tunnel has a normal appearance. Impression Impression: 1. Persistent but reduced inflammation and marrow edema within the visualized 
right second metatarsal compatible with partial healing.  
2. In the area of clinical interest, indicated by the patient by placing a 
marker on the lateral side of the ankle, there is mild marrow edema at the 
distal fibula presumably stress response to altered mechanics. Contusion might 
have a similar appearance. Minimal tendinosis and tenosynovitis of the peroneus 
longus and brevis tendons. No tear. 3. Probable mild plantar fasciitis. Written by Sheila Zelaya, as dictated by Dr. Adan Argueta. I, Adan Spears confirm that all documentation is accurate.

## 2024-12-13 NOTE — PROGRESS NOTES
[facility] Hospitalist Progress Note     CC: Hospital Follow up    PCP: Sb Villa DO       Assessment/Plan:     Active Problems:    Abdominal aortic aneurysm (AAA) (HCC)          Patient is a 69 year old female with PMH sig for hypertension, CVA, PAD abdominal aneurysm who presents for elective repair.     Abdominal aortic aneurysm  Status post endovascular repair of abdominal aortic aneurysm, POD 2  Postoperative pain  - IV/p.o. pain medications  - Blood pressure control per vascular surgery  - Hg stable  - CT angiogram to eval for repair - noted,   -Continue aspirin  - Vascular surgery following, recommendations reviewed  - Bp goal today 120-180, BPs have been labile      Hypertension  - Holding lisinopril and metoprolol for now  - Using PRNs to allow for tighter blood control     Prophylaxis:   DVT with SCDs  Atrophy Prophylaxis ambulate as tolerated  Lines/Monitors:      Dispo: ICU management  Code status:      Patient given opportunity to ask questions and note understanding and agreeing with therapeutic plan as outlined. Reviewed chart including previous progress notes    Baldemar Rodriguez MD  [facility] Hospitalist  [phone]  12/12/2024  6:49 PM       Subjective:     Tired, no n/v/cp. Walked to bathroom x2. 124/48, noted phenylephrine and nicardipine previously hanging.     OBJECTIVE:    Blood pressure (!) 146/95, pulse (!) 128, temperature 99.7 °F (37.6 °C), temperature source Temporal, resp. rate 19, height 5' 3\" (1.6 m), weight 113 lb 12.1 oz (51.6 kg), SpO2 (!) 86%, not currently breastfeeding.    Temp:  [98.6 °F (37 °C)-99.7 °F (37.6 °C)] 99.7 °F (37.6 °C)  Pulse:  [] 128  Resp:  [15-26] 19  BP: (124-172)/(47-95) 146/95  SpO2:  [86 %-98 %] 86 %  AO: (128-189)/(56-92) 160/85      Intake/Output:    Intake/Output Summary (Last 24 hours) at 12/12/2024 1848  Last data filed at 12/12/2024 0800  Gross per 24 hour   Intake 1542 ml   Output 1950 ml   Net -408 ml       Last 3 Weights   12/12/24  0613 113 lb 12.1 oz (51.6 kg)   12/11/24 0500 114 lb 6.7 oz (51.9 kg)   12/10/24 0629 105 lb 9.6 oz (47.9 kg)   12/09/24 1609 106 lb (48.1 kg)   09/24/19 1241 128 lb 12.8 oz (58.4 kg)   09/07/18 1317 120 lb 9.6 oz (54.7 kg)       Exam   Gen: No acute distress, alert and oriented x3, no focal neurologic deficits  Heent: NC AT, mucous memb clear, neck supple  Pulm: Lungs clear, normal respiratory effort  CV: Heart with regular rate and rhythm, no peripheral edema  Abd: Abdomen soft, nontender, nondistended, bowel sounds present  MSK: Full range of motion in extremities, no clubbing, no cyanosis ,b/l groin access site without hematoma  Skin: no rashes or lesions  Neuro: AO*3, motor intact, no sensory deficits  Psyc: appropriate mood and affect      Data Review:       Labs:     Recent Labs   Lab 12/11/24  0425 12/11/24  1352 12/12/24  0419   RBC 3.64* 3.27* 3.31*   HGB 11.8* 10.6* 10.9*   HCT 34.7* 30.7* 31.2*   MCV 95.3 93.9 94.3   MCH 32.4 32.4 32.9   MCHC 34.0 34.5 34.9   RDW 14.7 14.4 14.6   NEPRELIM 6.98 7.42 8.16*   WBC 9.1 9.5 12.1*   PLT 87.0* 72.0* 75.0*         Recent Labs   Lab 12/10/24  1433 12/11/24  0425 12/12/24  0419   * 140* 109*   BUN 11 9 5*   CREATSERUM 0.58 0.55 0.45*   EGFRCR 98 99 104   CA 7.6* 8.2* 8.7    139 138   K 3.6 4.1  4.1 3.5   * 106 102   CO2 22.0 23.0 28.0       Recent Labs   Lab 12/11/24  0425 12/12/24  0419   ALT 18 11   AST 38* 24   ALB 3.2 3.4         Imaging:  CTA CHEST CTA ABDOMEN CTA PELVIS (CPT=71275/87404)    Result Date: 12/11/2024  CONCLUSION:  Postsurgical changes of aortic dissection repair.  There is a stent graft in the ascending and descending thoracic aorta which is patent.  Probable endoleak at the descending thoracic aorta level.  Patent stents involving the celiac, SMA and main renal artery origins.  Patent aorto bi-iliac stent grafts.  There is a small dissection flap involving the left distal common femoral artery.  There are dissection flaps  extending into the right innominate artery, right common carotid artery, left common carotid and subclavian arteries.  Symmetric nephrograms.  LOCATION:  [facility]   Dictated by (CST): Lissa Tavares MD on 12/11/2024 at 11:33 AM     Finalized by (CST): Lissa Tavares MD on 12/11/2024 at 11:42 AM       XR CHEST AP PORTABLE  (CPT=71045)    Result Date: 12/10/2024  CONCLUSION:   Postoperative changes of cardiothoracic surgery including placement of aortic stent graft.  Heart size upper limits normal.  Findings of pulmonary venous hypertension with mild interstitial pulmonary edema.  No discrete airspace consolidation.  The pleural spaces are clear.  Right IJ sheath in place with central venous catheter terminating in the mid SVC.   LOCATION:  [id number]      Dictated by (CST): Apurva Machado MD on 12/10/2024 at 3:09 PM     Finalized by (CST): Apurva Machado MD on 12/10/2024 at 3:10 PM          Meds:      aspirin  81 mg Oral Daily    [Held by provider] enoxaparin  30 mg Subcutaneous Daily    docusate sodium  100 mg Oral BID      nitroGLYCERIN in dextrose 5%      niCARdipine Stopped (12/12/24 1140)    phenylephrine Stopped (12/12/24 0350)    norepinephrine         ondansetron    metoclopramide    nitroGLYCERIN in dextrose 5%    niCARdipine    phenylephrine    norepinephrine    HYDROmorphone **OR** HYDROmorphone **OR** HYDROmorphone    HYDROcodone-acetaminophen **OR** HYDROcodone-acetaminophen

## 2024-12-13 NOTE — PHYSICAL THERAPY NOTE
PHYSICAL THERAPY TREATMENT NOTE - INPATIENT    Room Number: 453/453-A     Session: 1     Number of Visits to Meet Established Goals: 5    Presenting Problem: s/p endovascular abdominal aortic aneurysm stent graft repair with fenestrated device 12/10  Co-Morbidities : descending thoracic aneurysm, PVD, TIA, HTN, depression    PHYSICAL THERAPY ASSESSMENT   Patient demonstrates excellent progress this session, goals  remain in progress.      Patient is requiring supervision and contact guard assist as a result of the following impairments: decreased functional strength, impaired standing balance, decreased muscular endurance, and medical status.     Patient continues to function near baseline with bed mobility, transfers, gait, and stair negotiation.  Next session anticipate patient to progress bed mobility, transfers, gait, and stair negotiation.  Physical Therapy will continue to follow patient for duration of hospitalization.    Patient continues to benefit from continued skilled PT services: at discharge to promote prior level of function and safety with additional support and return home with home health PT.    PLAN DURING HOSPITALIZATION  Nursing Mobility Recommendation : 1 Assist  PT Device Recommendation: Rolling walker  PT Treatment Plan: Bed mobility;Body mechanics;Coordination;Endurance;Energy conservation;Patient education;Family education;Gait training;Neuromuscular re-educate;Range of motion;Strengthening;Stoop training;Stair training;Transfer training;Balance training  Frequency (Obs): 3-5x/week     CURRENT GOALS     Goal #1 Patient is able to demonstrate supine - sit EOB @ level: supervision   met - upgrade to mod I   Goal #2 Patient is able to demonstrate transfers EOB to/from Chair/Wheelchair at assistance level: supervision - met 12/13      Goal #3 Patient is able to ambulate 150 feet with assist device: walker - rolling at assistance level: supervision- met 12/13      Goal #4 Patient is able to  navigate stairs with rail and SBA   Goal #5     Goal #6     Goal Comments: Goals established on 12/11/2024 12/13/2024 all goals ongoing or updated 12/13    History related to current admission: Patient is a 69 year old female admitted on 12/10/2024: s/p endovascular abdominal aortic aneurysm stent graft repair with fenestrated device.      12/11 vascular surg \"Up to chair and ambulate in room \"     HOME SITUATION  Type of Home: Apartment  Home Layout: One level  Stairs to Enter : 12   Railing: Yes              Lives With: Daughter    Drives: No   Patient Regularly Uses: Glasses;Cane;Rolling walker       Prior Level of [place]: Uses RW and SPC interchangable but always has the SPC on the stairs, had a fall in May broke her femur walking out of her apt, dtr does IADLs (dtr lives in [place] but has been staying with her), another fall reported x2 weeks ago (lost balance on SPC)    SUBJECTIVE  \"I don't need that thing!\" - referring to gait belt    OBJECTIVE  Precautions: Bed/chair alarm (-160)    WEIGHT BEARING RESTRICTION     PAIN ASSESSMENT   Rating: Unable to rate  Location: Mild groin  Management Techniques: Activity promotion;Repositioning    BALANCE                                                                                                                       Static Sitting: Good  Dynamic Sitting: Fair           Static Standing: Fair -  Dynamic Standing: Fair -    ACTIVITY TOLERANCE  Pulse: 93  Heart Rate Source: Monitor  Resp: 19                O2 WALK  Oxygen Therapy  SPO2% on Room Air at Rest: 99    AM-PAC '6-Clicks' INPATIENT SHORT FORM - BASIC MOBILITY  How much difficulty does the patient currently have...  Patient Difficulty: Turning over in bed (including adjusting bedclothes, sheets and blankets)?: None   Patient Difficulty: Sitting down on and standing up from a chair with arms (e.g., wheelchair, bedside commode, etc.): None   Patient Difficulty: Moving from lying on back to sitting on the  side of the bed?: None   How much help from another person does the patient currently need...   Help from Another: Moving to and from a bed to a chair (including a wheelchair)?: A Little   Help from Another: Need to walk in hospital room?: A Little   Help from Another: Climbing 3-5 steps with a railing?: A Little     AM-PAC Score:  Raw Score: 21   Approx Degree of Impairment: 28.97%   Standardized Score (AM-PAC Scale): 50.25   CMS Modifier (G-Code): CJ    FUNCTIONAL ABILITY STATUS  Gait Assessment   Functional Mobility/Gait Assessment  Gait Assistance: Supervision  Distance (ft): 175  Assistive Device: Rolling walker  Pattern: Shuffle  Stairs: Stairs  How Many Stairs: 5  Device: 1 Rail;Cane  Assist: Contact guard assist  Pattern: Ascend and Descend  Ascend and Descend : Step to    Skilled Therapy Provided    Bed Mobility:  Rolling: Left w/ hob flat - mod I   Supine<>Sit: Mod I w/ hob flat   Sit<>Supine: Mod I w/ hob flat     Transfer Mobility:  Sit<>Stand: Supervision assist   Stand<>Sit: Supervision assist   Gait: Pt completed gait 175'x1 w/ rw and supervision assist.  Note slow rita and shuffling gait, but no lob and conversant throughout w/ maintaining sat level.  Pt completed 5 steps w/ use of railing and cane - close cga.  Step to pattern, able to turn on stairs w/o difficulty.    Therapist's Comments: Pt required some additional education on importance of movement and safety required w/I hospital setting.       Patient End of Session: In bed;Needs met;Call light within reach;RN aware of session/findings;All patient questions and concerns addressed;Hospital anti-slip socks (Andrews Peters aware of treatment session)    PT Session Time: 25 minutes  Gait Trainin minutes  Therapeutic Activity: 5 minutes  Therapeutic Exercise: 0 minutes   Neuromuscular Re-education: 0 minutes

## 2024-12-13 NOTE — CDS QUERY
Dear Dr. Rodriguez:  Can you please clarify a diagnosis associated with the hemoglobin values/clinical information provided below:    ( X ) Anemia due to post operative blood loss  (  ) Other (please specify):     Documentation from the medical record:  Risk Factors: 12/10 endovascular abdominal aortic aneurysm stent graft repair with fenestrated device.  Clinical Indicators:   ___12/07/24 Pre-Operative Labs HGB14.4/HCT 42.5  ___12/10 Operative Report: fenestrated endovascular aortic repair EBL 200mL  ___12/10 HGB/HCT: 11.8/34.6-> HGB 12.8  ___Hospitalist Consult: Monitor for acute blood loss anemia, follow CBC  ___12/11 Nursing Note: Bleeding at L groin site and bruising noted at change of shift, but site still soft. Bruising outlined and did not spread during shift. R groin site soft with no bleeding. L groin site dressing changed, and more bleeding noted  APN notified. Hgb checked and vasc surgery MD notified by APN. Bleeding continued - fem stop applied per vas surgery MD orders - bleeding improved.  ___12/11 HGB/HCT: 11.8/34.7-> HGB 10.6/HCT 30.7  ___12/12 HGB/HCT: 10.9/31.2  ___12/13 HGB/HCT: 10.4/30.7  Treatment: Repeat labs.  Femstop to Left groin site              Use of terms such as suspected, possible, or probable (associated with a specific diagnosis that is being evaluated, monitored, or treated as if it exists) are acceptable and can be coded in the inpatient setting, when documented at the time of discharge.     Please add any additional documentation to your progress note and continue to document this through discharge.    Thank you. For questions regarding this query, please contact Clinical : Roxanna SALDANA RN, CCDS [phone]  This form is a permanent part of the medical record.

## 2024-12-13 NOTE — PROGRESS NOTES
Sherri Gillette Patient Status:  Inpatient    10/19/1955 MRN [id number]   [facility] 4SW-A Attending Hamlet Hoffman MD   Hosp Day # 3 PCP Sb Villa DO     Critical Care Progress Note          Subjective:  Weaned off vasopressors overnight.    Objective:    Medications:   magnesium oxide  400 mg Oral Once    potassium chloride  40 mEq Intravenous Once    aspirin  81 mg Oral Daily    [Held by provider] enoxaparin  30 mg Subcutaneous Daily    docusate sodium  100 mg Oral BID              Intake/Output Summary (Last 24 hours) at 2024 0724  Last data filed at 2024 0621  Gross per 24 hour   Intake 21 ml   Output 550 ml   Net -529 ml       /49   Pulse 79   Temp 99.4 °F (37.4 °C) (Temporal)   Resp 17   Ht 160 cm (5' 3\")   Wt 113 lb 12.1 oz (51.6 kg)   SpO2 96%   BMI 20.15 kg/m²     Physical Exam:    General Appearance: Alert, cooperative, no distress  Neck: No JVD  Lungs: diminished to auscultation bilaterally, respirations unlabored  Heart: Regular rate and rhythm, S1 and S2 normal, no murmur, rub or gallop  Abdomen: Soft, non-tender, bowel sounds hypoactive  Extremities: Atraumatic, no cyanosis or edema, capillary refill <3 sec.  Bilateral groins with + femoral pulses, dressings intact with no bleeding noted  Pulses: 2+ and symmetric all extremities  Skin: Skin color, texture, turgor normal for ethnicity, no rashes or lesions, warm and dry    Recent Labs   Lab 24  0511   RBC 3.24*   HGB 10.4*   HCT 30.7*   MCV 94.8   MCH 32.1   MCHC 33.9   RDW 14.0   NEPRELIM 5.45   WBC 8.5   PLT 73.0*     Recent Labs   Lab 24  0425 24  0419 24  0511   * 109* 96   BUN 9 5* 8*   CREATSERUM 0.55 0.45* 0.41*   CA 8.2* 8.7 8.7   ALB 3.2 3.4 3.2    138 136   K 4.1  4.1 3.5 3.4*  3.4*    102 104   CO2 23.0 28.0 26.0   ALKPHO 139 135 118   AST 38* 24 21   ALT 18 11 13   BILT 1.6* 1.3* 1.6*   TP 6.1 5.9 5.9     No results for input(s): \"ABGPHT\",  \"AOODSR0U\", \"OXSKH9I\", \"ABGHCO3\", \"ABGBE\", \"TEMP\", \"KEREN\", \"SITE\", \"DEV\", \"THGB\" in the last 168 hours.    Invalid input(s): \"LHJ85XZI\", \"CHOB\"  No results for input(s): \"BNP\" in the last 168 hours.  No results for input(s): \"TROP\", \"CK\" in the last 168 hours.    No results found.       Assessment/Plan:    Endovascular Abdominal Aortic Aneurysm POD #2 s/p graft repair with fenestrated device  - ASA, statin  - Neurovascular checks Q 4hr  - Pain management  - BP parameters per vascular surgery  - Vascular surgery following     Thrombocytopenia  - Plts low, but stable  - Ok to continue ASA per vascular, holding DVT proph  - No s/s of bleeding  - Daily CBC     CAD  - ASA, statin     HTN  - Vascular surgery managing BP meds    F/E/N:  - Follow lytes and replete prn  - ADAT    Proph:  - SCDs  - subcutaneous lovenox when ok with vascular surgery  - PT/OT    Dispo:   - Code Status: Full Code  - Stable to transfer to tele floor     Plan of care discussed with intensivist, Dr. Yanet Diaz [facility]  Critical Care  [id number]

## 2024-12-14 LAB
ALBUMIN SERPL-MCNC: 3.1 G/DL (ref 3.2–4.8)
ALBUMIN/GLOB SERPL: 1.3 {RATIO} (ref 1–2)
ALP LIVER SERPL-CCNC: 115 U/L
ALT SERPL-CCNC: 8 U/L
ANION GAP SERPL CALC-SCNC: 6 MMOL/L (ref 0–18)
AST SERPL-CCNC: 18 U/L (ref ?–34)
BILIRUB SERPL-MCNC: 1.1 MG/DL (ref 0.2–1.1)
BUN BLD-MCNC: 6 MG/DL (ref 9–23)
CALCIUM BLD-MCNC: 8.6 MG/DL (ref 8.7–10.4)
CHLORIDE SERPL-SCNC: 104 MMOL/L (ref 98–112)
CO2 SERPL-SCNC: 26 MMOL/L (ref 21–32)
CREAT BLD-MCNC: 0.4 MG/DL
EGFRCR SERPLBLD CKD-EPI 2021: 107 ML/MIN/1.73M2 (ref 60–?)
ERYTHROCYTE [DISTWIDTH] IN BLOOD BY AUTOMATED COUNT: 13.9 %
GLOBULIN PLAS-MCNC: 2.3 G/DL (ref 2–3.5)
GLUCOSE BLD-MCNC: 92 MG/DL (ref 70–99)
HCT VFR BLD AUTO: 28.7 %
HGB BLD-MCNC: 9.9 G/DL
MAGNESIUM SERPL-MCNC: 1.8 MG/DL (ref 1.6–2.6)
MCH RBC QN AUTO: 32.6 PG (ref 26–34)
MCHC RBC AUTO-ENTMCNC: 34.5 G/DL (ref 31–37)
MCV RBC AUTO: 94.4 FL
OSMOLALITY SERPL CALC.SUM OF ELEC: 279 MOSM/KG (ref 275–295)
PLATELET # BLD AUTO: 82 10(3)UL (ref 150–450)
PLATELETS.RETICULATED NFR BLD AUTO: 9.4 % (ref 0–7)
POTASSIUM SERPL-SCNC: 4 MMOL/L (ref 3.5–5.1)
POTASSIUM SERPL-SCNC: 4 MMOL/L (ref 3.5–5.1)
PROT SERPL-MCNC: 5.4 G/DL (ref 5.7–8.2)
RBC # BLD AUTO: 3.04 X10(6)UL
SODIUM SERPL-SCNC: 136 MMOL/L (ref 136–145)
WBC # BLD AUTO: 7.5 X10(3) UL (ref 4–11)

## 2024-12-14 RX ORDER — MAGNESIUM OXIDE 400 MG/1
400 TABLET ORAL ONCE
Status: COMPLETED | OUTPATIENT
Start: 2024-12-14 | End: 2024-12-14

## 2024-12-14 NOTE — OPERATIVE REPORT
Vascular Surgery Procedure Note  Sherri Gillette  [id number]  10/19/1955      Date: 12/06/2024        Procedure: Physician Planning of a patient specific fenestrated visceral aortic graft requiring a minimum of 90 minutes of physician time. CPT 60211     69 year-old female with numerous comorbidities with large thoracoabdominal aortic aneurysm with dissection.  She underwent preoperative planning that was extensive and revealed that a conventional Zenith fenestrated custom-made graft would not be an option. As such, open surgery vs.  a physician modified fenestrated endovascular aortic repair were discussed. She strongly desired to proceed with a minimally invasive approach.     After review of CT scan, detailed three-dimensional reconstruction was performed with SCADA Access it was determined that the patient would not have adequate seal zone without extending into the descending thoracic aorta through the visceral segment..  Therefore measurements were performed to create fenestrations for the  celiac, SMA, and bilateral renal arteries.  The measurements were recorded with the plan to perform on 12/10/2024. Over 90 minutes was used in measuring and creating the endograft for this patient. See operative dictation for procedure details.      Hamlet Hoffman MD  [facility]  Vascular Surgery

## 2024-12-14 NOTE — PROGRESS NOTES
Vascular surgery progress note    Patient seen and examined.  No issues overnight.  Vitals and labs stable.  On examination groins flat with no hematoma and dry skin adhesive present.  Sensation and motor intact.  Tolerating p.o. and ambulating.  Voiding without issue.  She is doing quite well after complex fenestrated thoracoabdominal aortic repair.  Will assess her progress this weekend.  Plan for discharge on Monday.  Will have PT evaluate need for inpatient rehabilitation.

## 2024-12-14 NOTE — PLAN OF CARE
Denies c/o malaise or cardiac symptoms.  Remains in NSR, murmur noted.  Lungs clear on RA.  Abdomen soft and non tender to touch with active bowel sounds in all four quadrants.  Neuro assessment negative with exception of non reactive pupils.  Does have visible cataracts.  Tolerating all medications well with with no adverse effects.  Negative for edema.  Bilateral groins with no evidence of hematoma, scab noted on left groin now both are XANDER.  All needs met by staff.      Problem: Patient/Family Goals  Goal: Patient/Family Long Term Goal  Description: Patient's Long Term Goal: \"stay out of the hospital\"    Interventions:  - Surgical intervention, BP monitoring    - See additional Care Plan goals for specific interventions  Outcome: Progressing  Goal: Patient/Family Short Term Goal  Description: Patient's Short Term Goal: \"control my back pain better and avoid future surgeries\"\"    Interventions:   - Pain assessment and treatment with PO pain medication  - See additional Care Plan goals for specific interventions  Outcome: Progressing     Problem: CARDIOVASCULAR - ADULT  Goal: Maintains optimal cardiac output and hemodynamic stability  Description: INTERVENTIONS:  - Monitor vital signs, rhythm, and trends  - Monitor for bleeding, hypotension and signs of decreased cardiac output  - Evaluate effectiveness of vasoactive medications to optimize hemodynamic stability  - Monitor arterial and/or venous puncture sites for bleeding and/or hematoma  - Assess quality of pulses, skin color and temperature  - Assess for signs of decreased coronary artery perfusion - ex. Angina  - Evaluate fluid balance, assess for edema, trend weights  Outcome: Progressing  Goal: Absence of cardiac arrhythmias or at baseline  Description: INTERVENTIONS:  - Continuous cardiac monitoring, monitor vital signs, obtain 12 lead EKG if indicated  - Evaluate effectiveness of antiarrhythmic and heart rate control medications as ordered  - Initiate  emergency measures for life threatening arrhythmias  - Monitor electrolytes and administer replacement therapy as ordered  Outcome: Progressing     Problem: PAIN - ADULT  Goal: Verbalizes/displays adequate comfort level or patient's stated pain goal  Description: INTERVENTIONS:  - Encourage pt to monitor pain and request assistance  - Assess pain using appropriate pain scale  - Administer analgesics based on type and severity of pain and evaluate response  - Implement non-pharmacological measures as appropriate and evaluate response  - Consider cultural and social influences on pain and pain management  - Manage/alleviate anxiety  - Utilize distraction and/or relaxation techniques  - Monitor for opioid side effects  - Notify MD/LIP if interventions unsuccessful or patient reports new pain  - Anticipate increased pain with activity and pre-medicate as appropriate  Outcome: Progressing

## 2024-12-14 NOTE — PLAN OF CARE
No acute events overnight.      0650: Pt transferred out of ICU to CTU8. Pt stated what she will notify her daughter later.    Problem: CARDIOVASCULAR - ADULT  Goal: Maintains optimal cardiac output and hemodynamic stability  Description: INTERVENTIONS:  - Monitor vital signs, rhythm, and trends  - Monitor for bleeding, hypotension and signs of decreased cardiac output  - Evaluate effectiveness of vasoactive medications to optimize hemodynamic stability  - Monitor arterial and/or venous puncture sites for bleeding and/or hematoma  - Assess quality of pulses, skin color and temperature  - Assess for signs of decreased coronary artery perfusion - ex. Angina  - Evaluate fluid balance, assess for edema, trend weights  Outcome: Progressing  Goal: Absence of cardiac arrhythmias or at baseline  Description: INTERVENTIONS:  - Continuous cardiac monitoring, monitor vital signs, obtain 12 lead EKG if indicated  - Evaluate effectiveness of antiarrhythmic and heart rate control medications as ordered  - Initiate emergency measures for life threatening arrhythmias  - Monitor electrolytes and administer replacement therapy as ordered  Outcome: Progressing     Problem: PAIN - ADULT  Goal: Verbalizes/displays adequate comfort level or patient's stated pain goal  Description: INTERVENTIONS:  - Encourage pt to monitor pain and request assistance  - Assess pain using appropriate pain scale  - Administer analgesics based on type and severity of pain and evaluate response  - Implement non-pharmacological measures as appropriate and evaluate response  - Consider cultural and social influences on pain and pain management  - Manage/alleviate anxiety  - Utilize distraction and/or relaxation techniques  - Monitor for opioid side effects  - Notify MD/LIP if interventions unsuccessful or patient reports new pain  - Anticipate increased pain with activity and pre-medicate as appropriate  Outcome: Progressing

## 2024-12-14 NOTE — PROGRESS NOTES
[facility] Hospitalist Progress Note     CC: Hospital Follow up    PCP: Sb Villa DO       Assessment/Plan:     Active Problems:    Abdominal aortic aneurysm (AAA) (HCC)          Patient is a 69 year old female with PMH sig for hypertension, CVA, PAD abdominal aneurysm who presents for elective repair.     Abdominal aortic aneurysm  Status post endovascular repair of abdominal aortic aneurysm 12/10  Postoperative pain  - asa, statin  - vascular following, state that f/u CT scan was okay     Hypertension  - Holding lisinopril and metoprolol for now  - Using PRNs to allow for tighter blood control    Thrombocytopenia- plts stable in 80s for now       Prophylaxis:   DVT with SCDs  Atrophy Prophylaxis ambulate as tolerated  Lines/Monitors:         Subjective:     Continues to ambulate, no shortness of breath    OBJECTIVE:    Blood pressure 120/82, pulse 87, temperature 97.6 °F (36.4 °C), temperature source Oral, resp. rate 21, height 5' 3\" (1.6 m), weight 105 lb 9.6 oz (47.9 kg), SpO2 100%, not currently breastfeeding.    Temp:  [97 °F (36.1 °C)-99.1 °F (37.3 °C)] 97.6 °F (36.4 °C)  Pulse:  [69-98] 87  Resp:  [15-24] 21  BP: (101-178)/(43-83) 120/82  SpO2:  [94 %-100 %] 100 %      Intake/Output:  No intake or output data in the 24 hours ending 12/14/24 1346      Last 3 Weights   12/14/24 0403 105 lb 9.6 oz (47.9 kg)   12/12/24 0613 113 lb 12.1 oz (51.6 kg)   12/11/24 0500 114 lb 6.7 oz (51.9 kg)   12/10/24 0629 105 lb 9.6 oz (47.9 kg)   12/09/24 1609 106 lb (48.1 kg)   09/24/19 1241 128 lb 12.8 oz (58.4 kg)   09/07/18 1317 120 lb 9.6 oz (54.7 kg)       Exam   Gen: No acute distress, alert and oriented x3, no focal neurologic deficits  Heent: NC AT, mucous memb clear, neck supple  Pulm: Lungs clear, normal respiratory effort  CV: Heart with regular rate and rhythm, no peripheral edema.  +systolic murmur  Abd: Abdomen soft, nontender, nondistended, bowel sounds present  Skin: no rashes or lesions  Neuro: AO*3, motor intact,  no sensory deficits  Psyc: appropriate mood and affect      Data Review:       Labs:     Recent Labs   Lab 12/11/24  1352 12/12/24  0419 12/13/24  0511 12/14/24  0357   RBC 3.27* 3.31* 3.24* 3.04*   HGB 10.6* 10.9* 10.4* 9.9*   HCT 30.7* 31.2* 30.7* 28.7*   MCV 93.9 94.3 94.8 94.4   MCH 32.4 32.9 32.1 32.6   MCHC 34.5 34.9 33.9 34.5   RDW 14.4 14.6 14.0 13.9   NEPRELIM 7.42 8.16* 5.45  --    WBC 9.5 12.1* 8.5 7.5   PLT 72.0* 75.0* 73.0* 82.0*         Recent Labs   Lab 12/12/24  0419 12/13/24  0511 12/14/24  0357   * 96 92   BUN 5* 8* 6*   CREATSERUM 0.45* 0.41* 0.40*   EGFRCR 104 106 107   CA 8.7 8.7 8.6*    136 136   K 3.5 3.4*  3.4* 4.0  4.0    104 104   CO2 28.0 26.0 26.0       Recent Labs   Lab 12/11/24  0425 12/12/24  0419 12/13/24  0511 12/14/24  0357   ALT 18 11 13 8*   AST 38* 24 21 18   ALB 3.2 3.4 3.2 3.1*         Imaging:  No results found.      Meds:      polyethylene glycol (PEG 3350)  17 g Oral Daily    aspirin  81 mg Oral Daily    [Held by provider] enoxaparin  30 mg Subcutaneous Daily    docusate sodium  100 mg Oral BID           ondansetron    metoclopramide    HYDROmorphone **OR** HYDROmorphone **OR** HYDROmorphone    HYDROcodone-acetaminophen **OR** HYDROcodone-acetaminophen

## 2024-12-15 LAB
ALBUMIN SERPL-MCNC: 3.3 G/DL (ref 3.2–4.8)
ALBUMIN/GLOB SERPL: 1.3 {RATIO} (ref 1–2)
ALP LIVER SERPL-CCNC: 126 U/L
ALT SERPL-CCNC: 9 U/L
ANION GAP SERPL CALC-SCNC: 8 MMOL/L (ref 0–18)
AST SERPL-CCNC: 25 U/L (ref ?–34)
BILIRUB SERPL-MCNC: 1 MG/DL (ref 0.2–1.1)
BUN BLD-MCNC: 9 MG/DL (ref 9–23)
CALCIUM BLD-MCNC: 8.7 MG/DL (ref 8.7–10.4)
CHLORIDE SERPL-SCNC: 102 MMOL/L (ref 98–112)
CO2 SERPL-SCNC: 24 MMOL/L (ref 21–32)
CREAT BLD-MCNC: 0.4 MG/DL
EGFRCR SERPLBLD CKD-EPI 2021: 107 ML/MIN/1.73M2 (ref 60–?)
ERYTHROCYTE [DISTWIDTH] IN BLOOD BY AUTOMATED COUNT: 13.5 %
GLOBULIN PLAS-MCNC: 2.5 G/DL (ref 2–3.5)
GLUCOSE BLD-MCNC: 95 MG/DL (ref 70–99)
HCT VFR BLD AUTO: 30.2 %
HGB BLD-MCNC: 10.5 G/DL
MAGNESIUM SERPL-MCNC: 1.8 MG/DL (ref 1.6–2.6)
MCH RBC QN AUTO: 32.3 PG (ref 26–34)
MCHC RBC AUTO-ENTMCNC: 34.8 G/DL (ref 31–37)
MCV RBC AUTO: 92.9 FL
OSMOLALITY SERPL CALC.SUM OF ELEC: 276 MOSM/KG (ref 275–295)
PLATELET # BLD AUTO: 105 10(3)UL (ref 150–450)
PLATELETS.RETICULATED NFR BLD AUTO: 6 % (ref 0–7)
POTASSIUM SERPL-SCNC: 3.8 MMOL/L (ref 3.5–5.1)
PROT SERPL-MCNC: 5.8 G/DL (ref 5.7–8.2)
RBC # BLD AUTO: 3.25 X10(6)UL
SODIUM SERPL-SCNC: 134 MMOL/L (ref 136–145)
WBC # BLD AUTO: 6 X10(3) UL (ref 4–11)

## 2024-12-15 RX ORDER — MAGNESIUM OXIDE 400 MG/1
400 TABLET ORAL ONCE
Status: COMPLETED | OUTPATIENT
Start: 2024-12-15 | End: 2024-12-15

## 2024-12-15 RX ORDER — POTASSIUM CHLORIDE 1500 MG/1
40 TABLET, EXTENDED RELEASE ORAL ONCE
Status: COMPLETED | OUTPATIENT
Start: 2024-12-15 | End: 2024-12-15

## 2024-12-15 NOTE — PROGRESS NOTES
[facility] Hospitalist Progress Note     CC: Hospital Follow up    PCP: Sb Villa DO       Assessment/Plan:     Active Problems:    Abdominal aortic aneurysm (AAA) (HCC)          Patient is a 69 year old female with PMH sig for hypertension, CVA, PAD abdominal aneurysm who presents for elective repair.     Abdominal aortic aneurysm  Status post endovascular repair of abdominal aortic aneurysm 12/10  Postoperative pain  - asa, statin  --discussed with Dr. Hoffman. Need to continue to work on oral intake, hydration, hopefully getting bowels moving prior to discharge. Will give dose of milk of mag today. Already on scheduled colace and miralax.     Hypertension  - Holding lisinopril and metoprolol for now-- discussed with Dr. Hoffman- goal bp 120-160s so no need to start at this point.     Thrombocytopenia- plts improving up to 105    Ambulating well per nursing staff     Subjective:     Trying to take a nap this morning. Eating here and there, states she is not a big eater. No BM charted.    OBJECTIVE:    Blood pressure 159/67, pulse 91, temperature 98.4 °F (36.9 °C), temperature source Oral, resp. rate 24, height 5' 3\" (1.6 m), weight 105 lb 9.6 oz (47.9 kg), SpO2 100%, not currently breastfeeding.    Temp:  [97.6 °F (36.4 °C)-98.7 °F (37.1 °C)] 98.4 °F (36.9 °C)  Pulse:  [] 91  Resp:  [16-24] 24  BP: (120-159)/(66-82) 159/67  SpO2:  [97 %-100 %] 100 %      Intake/Output:    Intake/Output Summary (Last 24 hours) at 12/15/2024 0924  Last data filed at 12/15/2024 0754  Gross per 24 hour   Intake 1101 ml   Output 600 ml   Net 501 ml         Last 3 Weights   12/14/24 0403 105 lb 9.6 oz (47.9 kg)   12/12/24 0613 113 lb 12.1 oz (51.6 kg)   12/11/24 0500 114 lb 6.7 oz (51.9 kg)   12/10/24 0629 105 lb 9.6 oz (47.9 kg)   12/09/24 1609 106 lb (48.1 kg)   09/24/19 1241 128 lb 12.8 oz (58.4 kg)   09/07/18 1317 120 lb 9.6 oz (54.7 kg)       Exam   Gen: No acute distress, alert and oriented x3, no focal neurologic  deficits  Heent: NC AT, mucous memb clear, neck supple  Pulm: Lungs clear, normal respiratory effort  CV: Heart with regular rate and rhythm, no peripheral edema.  +systolic murmur  Abd: Abdomen soft, nontender, nondistended, bowel sounds present  Skin: no rashes or lesions  Neuro: AO*3, motor intact, no sensory deficits  Psyc: appropriate mood and affect      Data Review:       Labs:     Recent Labs   Lab 12/11/24  1352 12/12/24 0419 12/13/24  0511 12/14/24  0357 12/15/24  0503   RBC 3.27* 3.31* 3.24* 3.04* 3.25*   HGB 10.6* 10.9* 10.4* 9.9* 10.5*   HCT 30.7* 31.2* 30.7* 28.7* 30.2*   MCV 93.9 94.3 94.8 94.4 92.9   MCH 32.4 32.9 32.1 32.6 32.3   MCHC 34.5 34.9 33.9 34.5 34.8   RDW 14.4 14.6 14.0 13.9 13.5   NEPRELIM 7.42 8.16* 5.45  --   --    WBC 9.5 12.1* 8.5 7.5 6.0   PLT 72.0* 75.0* 73.0* 82.0* 105.0*         Recent Labs   Lab 12/13/24  0511 12/14/24  0357 12/15/24  0503   GLU 96 92 95   BUN 8* 6* 9   CREATSERUM 0.41* 0.40* 0.40*   EGFRCR 106 107 107   CA 8.7 8.6* 8.7    136 134*   K 3.4*  3.4* 4.0  4.0 3.8    104 102   CO2 26.0 26.0 24.0       Recent Labs   Lab 12/11/24  0425 12/12/24  0419 12/13/24  0511 12/14/24  0357 12/15/24  0503   ALT 18 11 13 8* 9*   AST 38* 24 21 18 25   ALB 3.2 3.4 3.2 3.1* 3.3         Imaging:  No results found.      Meds:      polyethylene glycol (PEG 3350)  17 g Oral Daily    aspirin  81 mg Oral Daily    [Held by provider] enoxaparin  30 mg Subcutaneous Daily    docusate sodium  100 mg Oral BID           ondansetron    metoclopramide    HYDROmorphone **OR** HYDROmorphone **OR** HYDROmorphone    HYDROcodone-acetaminophen **OR** HYDROcodone-acetaminophen

## 2024-12-15 NOTE — PLAN OF CARE
Rec'd pt at 0730. A&O x 4. Tele shows NSR. O2 sats adequate on RA. Pt continent, up w/ 1 and walker. No BM since PTA, encouraged by MD's and this RN to take laxatives, pt declined colace/miralax/milk of magnesia. Offered prune juice, pt declined, states she will drink prune juice when she goes home tomorrow. No C/O pain or SOB, norco available PRN. Groin sites CDI. Bed locked and in low position, call light and personal items within reach. Will continue to monitor. POC - Monitor BP, discharging w/ HHPT when medically cleared.    Problem: Patient/Family Goals  Goal: Patient/Family Long Term Goal  Description: Patient's Long Term Goal: \"stay out of the hospital\"    Interventions:  - Surgical intervention, BP monitoring    - See additional Care Plan goals for specific interventions  Outcome: Progressing  Goal: Patient/Family Short Term Goal  Description: Patient's Short Term Goal: \"control my back pain better and avoid future surgeries\"\"    Interventions:   - Pain assessment and treatment with PO pain medication  - See additional Care Plan goals for specific interventions  Outcome: Progressing     Problem: CARDIOVASCULAR - ADULT  Goal: Maintains optimal cardiac output and hemodynamic stability  Description: INTERVENTIONS:  - Monitor vital signs, rhythm, and trends  - Monitor for bleeding, hypotension and signs of decreased cardiac output  - Evaluate effectiveness of vasoactive medications to optimize hemodynamic stability  - Monitor arterial and/or venous puncture sites for bleeding and/or hematoma  - Assess quality of pulses, skin color and temperature  - Assess for signs of decreased coronary artery perfusion - ex. Angina  - Evaluate fluid balance, assess for edema, trend weights  Outcome: Progressing  Goal: Absence of cardiac arrhythmias or at baseline  Description: INTERVENTIONS:  - Continuous cardiac monitoring, monitor vital signs, obtain 12 lead EKG if indicated  - Evaluate effectiveness of antiarrhythmic and  heart rate control medications as ordered  - Initiate emergency measures for life threatening arrhythmias  - Monitor electrolytes and administer replacement therapy as ordered  Outcome: Progressing     Problem: PAIN - ADULT  Goal: Verbalizes/displays adequate comfort level or patient's stated pain goal  Description: INTERVENTIONS:  - Encourage pt to monitor pain and request assistance  - Assess pain using appropriate pain scale  - Administer analgesics based on type and severity of pain and evaluate response  - Implement non-pharmacological measures as appropriate and evaluate response  - Consider cultural and social influences on pain and pain management  - Manage/alleviate anxiety  - Utilize distraction and/or relaxation techniques  - Monitor for opioid side effects  - Notify MD/LIP if interventions unsuccessful or patient reports new pain  - Anticipate increased pain with activity and pre-medicate as appropriate  Outcome: Progressing

## 2024-12-15 NOTE — PLAN OF CARE
Assumed patient care at 1930.  Alert and oriented x 3-4. Resting in bed  Denies chest pain or shortness of breath  Vital signs are stable.  Plan of care updated with patient regarding evening medications and fall prevention measures.  Telemetry monitor in progress NSR  Problem: Patient/Family Goals  Goal: Patient/Family Long Term Goal  Description: Patient's Long Term Goal: \"stay out of the hospital\"    Interventions:  - Surgical intervention, BP monitoring    - See additional Care Plan goals for specific interventions  Outcome: Progressing  Goal: Patient/Family Short Term Goal  Description: Patient's Short Term Goal: \"control my back pain better and avoid future surgeries\"\"    Interventions:   - Pain assessment and treatment with PO pain medication  - See additional Care Plan goals for specific interventions  Outcome: Progressing     Problem: CARDIOVASCULAR - ADULT  Goal: Maintains optimal cardiac output and hemodynamic stability  Description: INTERVENTIONS:  - Monitor vital signs, rhythm, and trends  - Monitor for bleeding, hypotension and signs of decreased cardiac output  - Evaluate effectiveness of vasoactive medications to optimize hemodynamic stability  - Monitor arterial and/or venous puncture sites for bleeding and/or hematoma  - Assess quality of pulses, skin color and temperature  - Assess for signs of decreased coronary artery perfusion - ex. Angina  - Evaluate fluid balance, assess for edema, trend weights  Outcome: Progressing  Goal: Absence of cardiac arrhythmias or at baseline  Description: INTERVENTIONS:  - Continuous cardiac monitoring, monitor vital signs, obtain 12 lead EKG if indicated  - Evaluate effectiveness of antiarrhythmic and heart rate control medications as ordered  - Initiate emergency measures for life threatening arrhythmias  - Monitor electrolytes and administer replacement therapy as ordered  Outcome: Progressing     Problem: PAIN - ADULT  Goal: Verbalizes/displays adequate comfort  level or patient's stated pain goal  Description: INTERVENTIONS:  - Encourage pt to monitor pain and request assistance  - Assess pain using appropriate pain scale  - Administer analgesics based on type and severity of pain and evaluate response  - Implement non-pharmacological measures as appropriate and evaluate response  - Consider cultural and social influences on pain and pain management  - Manage/alleviate anxiety  - Utilize distraction and/or relaxation techniques  - Monitor for opioid side effects  - Notify MD/LIP if interventions unsuccessful or patient reports new pain  - Anticipate increased pain with activity and pre-medicate as appropriate  Outcome: Progressing

## 2024-12-16 VITALS
TEMPERATURE: 99 F | SYSTOLIC BLOOD PRESSURE: 151 MMHG | DIASTOLIC BLOOD PRESSURE: 54 MMHG | HEIGHT: 63 IN | OXYGEN SATURATION: 98 % | RESPIRATION RATE: 18 BRPM | HEART RATE: 90 BPM | BODY MASS INDEX: 18.71 KG/M2 | WEIGHT: 105.63 LBS

## 2024-12-16 LAB
ALBUMIN SERPL-MCNC: 3.3 G/DL (ref 3.2–4.8)
ALBUMIN/GLOB SERPL: 1.3 {RATIO} (ref 1–2)
ALP LIVER SERPL-CCNC: 129 U/L
ALT SERPL-CCNC: <7 U/L
ANION GAP SERPL CALC-SCNC: 7 MMOL/L (ref 0–18)
AST SERPL-CCNC: 16 U/L (ref ?–34)
BILIRUB SERPL-MCNC: 0.9 MG/DL (ref 0.2–1.1)
BUN BLD-MCNC: 8 MG/DL (ref 9–23)
CALCIUM BLD-MCNC: 8.9 MG/DL (ref 8.7–10.4)
CHLORIDE SERPL-SCNC: 104 MMOL/L (ref 98–112)
CO2 SERPL-SCNC: 24 MMOL/L (ref 21–32)
CREAT BLD-MCNC: 0.38 MG/DL
EGFRCR SERPLBLD CKD-EPI 2021: 108 ML/MIN/1.73M2 (ref 60–?)
ERYTHROCYTE [DISTWIDTH] IN BLOOD BY AUTOMATED COUNT: 13.5 %
GLOBULIN PLAS-MCNC: 2.5 G/DL (ref 2–3.5)
GLUCOSE BLD-MCNC: 99 MG/DL (ref 70–99)
HCT VFR BLD AUTO: 30.9 %
HGB BLD-MCNC: 10.7 G/DL
MAGNESIUM SERPL-MCNC: 1.8 MG/DL (ref 1.6–2.6)
MCH RBC QN AUTO: 32.2 PG (ref 26–34)
MCHC RBC AUTO-ENTMCNC: 34.6 G/DL (ref 31–37)
MCV RBC AUTO: 93.1 FL
OSMOLALITY SERPL CALC.SUM OF ELEC: 278 MOSM/KG (ref 275–295)
PLATELET # BLD AUTO: 145 10(3)UL (ref 150–450)
POTASSIUM SERPL-SCNC: 4.2 MMOL/L (ref 3.5–5.1)
POTASSIUM SERPL-SCNC: 4.2 MMOL/L (ref 3.5–5.1)
PROT SERPL-MCNC: 5.8 G/DL (ref 5.7–8.2)
RBC # BLD AUTO: 3.32 X10(6)UL
SODIUM SERPL-SCNC: 135 MMOL/L (ref 136–145)
WBC # BLD AUTO: 5.7 X10(3) UL (ref 4–11)

## 2024-12-16 RX ORDER — CLOPIDOGREL BISULFATE 75 MG/1
75 TABLET ORAL DAILY
Status: DISCONTINUED | OUTPATIENT
Start: 2024-12-16 | End: 2024-12-16

## 2024-12-16 RX ORDER — CLOPIDOGREL BISULFATE 75 MG/1
75 TABLET ORAL DAILY
Qty: 90 TABLET | Refills: 0 | Status: SHIPPED | OUTPATIENT
Start: 2024-12-16

## 2024-12-16 RX ORDER — PSEUDOEPHEDRINE HCL 30 MG
100 TABLET ORAL 2 TIMES DAILY
Qty: 30 CAPSULE | Refills: 0 | Status: SHIPPED | OUTPATIENT
Start: 2024-12-16

## 2024-12-16 RX ORDER — MAGNESIUM OXIDE 400 MG/1
400 TABLET ORAL ONCE
Status: COMPLETED | OUTPATIENT
Start: 2024-12-16 | End: 2024-12-16

## 2024-12-16 NOTE — PLAN OF CARE
Assumed care from nocturnal RN. Patient is A&O x4 and was cooperative throughout the shift. Patient is able to ambulate with one and a front wheel walker. Telemetry monitor reads NSR w/ PAC's and O2 is stable on room air. Pt reports last bowel movement 12/10, but is refusing laxatives and softeners, MD notified. Education was provided on risks of constipation and importance of taking laxatives/softeners, and patient verbalized understanding. Daughter was updated on plan of care. Plan to discharge once daughter is off of work. Patient safety was maintained. Call light and belongings are in reach.       Problem: Patient/Family Goals  Goal: Patient/Family Long Term Goal  Description: Patient's Long Term Goal: \"stay out of the hospital\"    Interventions:  - Surgical intervention, BP monitoring    - See additional Care Plan goals for specific interventions  Outcome: Progressing  Goal: Patient/Family Short Term Goal  Description: Patient's Short Term Goal: \"control my back pain better and avoid future surgeries\"\"    Interventions:   - Pain assessment and treatment with PO pain medication  - See additional Care Plan goals for specific interventions  Outcome: Progressing     Problem: CARDIOVASCULAR - ADULT  Goal: Maintains optimal cardiac output and hemodynamic stability  Description: INTERVENTIONS:  - Monitor vital signs, rhythm, and trends  - Monitor for bleeding, hypotension and signs of decreased cardiac output  - Evaluate effectiveness of vasoactive medications to optimize hemodynamic stability  - Monitor arterial and/or venous puncture sites for bleeding and/or hematoma  - Assess quality of pulses, skin color and temperature  - Assess for signs of decreased coronary artery perfusion - ex. Angina  - Evaluate fluid balance, assess for edema, trend weights  Outcome: Progressing  Goal: Absence of cardiac arrhythmias or at baseline  Description: INTERVENTIONS:  - Continuous cardiac monitoring, monitor vital signs, obtain  12 lead EKG if indicated  - Evaluate effectiveness of antiarrhythmic and heart rate control medications as ordered  - Initiate emergency measures for life threatening arrhythmias  - Monitor electrolytes and administer replacement therapy as ordered  Outcome: Progressing     Problem: PAIN - ADULT  Goal: Verbalizes/displays adequate comfort level or patient's stated pain goal  Description: INTERVENTIONS:  - Encourage pt to monitor pain and request assistance  - Assess pain using appropriate pain scale  - Administer analgesics based on type and severity of pain and evaluate response  - Implement non-pharmacological measures as appropriate and evaluate response  - Consider cultural and social influences on pain and pain management  - Manage/alleviate anxiety  - Utilize distraction and/or relaxation techniques  - Monitor for opioid side effects  - Notify MD/LIP if interventions unsuccessful or patient reports new pain  - Anticipate increased pain with activity and pre-medicate as appropriate  Outcome: Progressing

## 2024-12-16 NOTE — PROGRESS NOTES
[facility]   part of [facility]     Vascular Surgery Progress Note    Sherri Gillette Patient Status:  Inpatient    10/19/1955 MRN [id number]   Location [facility] 8NE-A Attending Hamlet Hoffman MD   Hosp Day # 6 PCP Sb Villa DO     Objective:   Temp: 98.7 °F (37.1 °C)  Pulse: 78  Resp: 16  BP: 149/59    Intake/Output:     Intake/Output Summary (Last 24 hours) at 2024 0812  Last data filed at 2024 0300  Gross per 24 hour   Intake 672 ml   Output 800 ml   Net -128 ml     Events Yesterday/Overnight:    -No acute events overnight. Patient reports mild groin incisional soreness. No other complaints Patient with minimal appetite which is is slowly improving- drinking protein shakes. Patient is voiding and ambulating in the halls. Patient has not had a bowel movement during the hospital stay.     Exam:    -Bilateral groin sites with dermabond in place, no hematoma or erythema. Groin sites are soft, non-tender, minimal ecchymosis. Sensation and motor intact.     Impression/Plan:    - Continue Aspirin/ Plavix daily  - Can shower- making sure to pat incisions dry  - Monitor blood pressure at home with a goal -180  - If SBP is > 180 may take 1/2 dose of home metoprolol   - Will schedue f/u in clinic for 2 weeks  -Okay to DC Home  - Reviewed with RN- will call daughter about POC    Medications:  Current Facility-Administered Medications   Medication Dose Route Frequency    magnesium oxide (Mag-Ox) tab 400 mg  400 mg Oral Once    magnesium hydroxide (Milk of Magnesia) 400 MG/5ML oral suspension 30 mL  30 mL Oral Once    polyethylene glycol (PEG 3350) (Miralax) 17 g oral packet 17 g  17 g Oral Daily    aspirin DR tab 81 mg  81 mg Oral Daily    ondansetron (Zofran) 4 MG/2ML injection 4 mg  4 mg Intravenous Q6H PRN    metoclopramide (Reglan) 5 mg/mL injection 10 mg  10 mg Intravenous Q8H PRN    [Held by provider] enoxaparin (Lovenox) 30 MG/0.3ML SUBQ injection 30 mg  30 mg Subcutaneous  Daily    HYDROmorphone (Dilaudid) 1 MG/ML injection 0.2 mg  0.2 mg Intravenous Q2H PRN    Or    HYDROmorphone (Dilaudid) 1 MG/ML injection 0.4 mg  0.4 mg Intravenous Q2H PRN    Or    HYDROmorphone (Dilaudid) 1 MG/ML injection 0.8 mg  0.8 mg Intravenous Q2H PRN    HYDROcodone-acetaminophen (Norco)  MG per tab 1 tablet  1 tablet Oral Q6H PRN    Or    HYDROcodone-acetaminophen (Norco)  MG per tab 2 tablet  2 tablet Oral Q6H PRN    docusate sodium (Colace) cap 100 mg  100 mg Oral BID       Laboratory/Data:    LABS:  Recent Labs   Lab   0000 12/10/24  0621 12/10/24  1433 12/10/24  2159 12/11/24  0425 12/11/24  1352 12/12/24  0419 12/13/24  0511 12/14/24  0357 12/15/24  0503 12/16/24  0521   WBC   < >  --  7.4  --  9.1   < > 12.1* 8.5 7.5 6.0 5.7   HGB  --   --  11.8*   < > 11.8*   < > 10.9* 10.4* 9.9* 10.5* 10.7*   MCV   < >  --  95.1  --  95.3   < > 94.3 94.8 94.4 92.9 93.1   PLT   < >  --  123.0*  --  87.0*   < > 75.0* 73.0* 82.0* 105.0* 145.0*   INR  --  1.01 1.20  --  1.12  --   --   --   --   --   --     < > = values in this interval not displayed.       Recent Labs   Lab 12/12/24  0419 12/13/24  0511 12/14/24  0357 12/15/24  0503 12/16/24  0521    136 136 134* 135*   K 3.5 3.4*  3.4* 4.0  4.0 3.8 4.2  4.2    104 104 102 104   CO2 28.0 26.0 26.0 24.0 24.0   BUN 5* 8* 6* 9 8*   CREATSERUM 0.45* 0.41* 0.40* 0.40* 0.38*   * 96 92 95 99   CA 8.7 8.7 8.6* 8.7 8.9   MG 1.6 1.8 1.8 1.8 1.8     Recent Labs   Lab 12/10/24  0621 12/10/24  1433 12/11/24  0425   PTP 13.4 15.3* 14.6   INR 1.01 1.20 1.12   PTT 26.8  --  30.7     Recent Labs   Lab 12/12/24  0419 12/13/24  0511 12/14/24  0357 12/15/24  0503 12/16/24  0521   ALT 11 13 8* 9* <7*   AST 24 21 18 25 16   ALB 3.4 3.2 3.1* 3.3 3.3     No results for input(s): \"TROP\" in the last 168 hours.  No results found for: \"ANAS\", \"JOSE\", \"ANASCRN\"  No results for input(s): \"PCACT\", \"PSACT\", \"AT3ACT\", \"HIPAB\", \"PATHI\", \"STALA\", \"DRVVTRATIO\", \"DRVVT\",  \"STACLOT\", \"CARIGG\", \"U5KO4VODAM\", \"K0UM2QWQGC\", \"RA\", \"HAVIGM\", \"HBCIGM\", \"HCVAB\", \"HBSAG\", \"HBCAB\", \"HBVDNAINTERP\", \"ANAS\", \"C3\", \"C4\" in the last 168 hours.    Isidra HOLLIS, APRN  12/16/2024  8:12 AM

## 2024-12-16 NOTE — DISCHARGE INSTRUCTIONS
Take Aspirin and Clopidogrel daily  Monitor blood pressure at home 3 times daily- keep a journal of your blood pressure- Goal is an -180  If SBP is > 180 you make take a 1/2 dose of your metoprolol  You may shower with soap and water daily- make sure to pat incisions dry  Follow up in clinic in 2 weeks- the office will be reaching out to you to schedule an appointment  Call the office if you develop any fevers at home or notice any bulging/drainage coming from your incision  If you have not had a bowel movement at home call the clinic and we will prescribe you a bowel regimen [phone]

## 2024-12-16 NOTE — DISCHARGE SUMMARY
[facility] Discharge Summary    Patient ID  Sherri Gillette  [id number]  69 year old  10/19/1955    Admit date: 12/10/2024    Discharge date: 12/16/24    Attending: Hamlet Hoffman MD     Primary Care Physician: Sb Villa DO      Reason for admission: electvie surgery    Discharge condition: stable    Disposition: home    Important follow up:  -PCP within 7 d  -specialists:  vascular as directed    -labs:    -radiology:      Additional patient instructions       Discharge med list     Medication List        ASK your doctor about these medications      aspirin 81 MG Tbec     lisinopril 2.5 MG Tabs  Commonly known as: Prinivil; Zestril     metoprolol tartrate 25 MG Tabs  Commonly known as: Lopressor  Take one tablet by mouth twice daily              Discharge Diagnoses:      Abdominal aortic aneurysm  Status post endovascular repair of abdominal aortic aneurysm 12/10  Postoperative pain  Hypertension     Consults:  IP CONSULT TO RESPIRATORY CARE  IP CONSULT TO HOSPITALIST  IP CONSULT TO CRITICAL CARE INTENSIVIST  IP CONSULT TO SOCIAL WORK  IP CONSULT TO SOCIAL WORK    Radiology:  CTA CHEST CTA ABDOMEN CTA PELVIS (CPT=71275/06859)    Result Date: 12/11/2024  PROCEDURE:  CTA CHEST CTA ABDOMEN CTA PELVIS (CPT=71275/31112)  COMPARISON:  None.  INDICATIONS:  post dissection repair  TECHNIQUE:  CT (with CTA imaging) of the chest, abdomen, and pelvis were obtained. Multi-planar reformatted/3-D images were created to optimize visualization of vascular anatomy.  Dose reduction techniques were used. Dose information is transmitted to the ACR (American College of Radiology) NRDR (National Radiology Data Registry) which includes the Dose Index Registry.  PATIENT STATED HISTORY:(As transcribed by Technologist)  Patient states post dissection repair.   CONTRAST USED:  100cc of Isovue 370  FINDINGS:   CHEST:  LUNGS:  Exam is degraded by respiratory motion.  There is scattered bilateral subsegmental atelectasis in the  lung bases and left upper lobe.  Slight reticular interstitial opacity in the right upper lobe anteriorly likely related to post radiation change.  MEDIASTINUM:  No enlarged mediastinal or hilar adenopathy CARDIAC:  Cardiomegaly.  No significant pericardial effusion  PLEURA:  No pneumothorax or effusion.  CHEST WALL:  Right mastectomy AORTA:  Postsurgical changes of aortic dissection repair.  There are intimal flaps consistent with dissections extending into the right innominate artery and right common carotid artery as well as left common carotid and left subclavian arteries.  There is a stent graft which is patent arising from the ascending and continuing through the descending thoracic aorta into the abdominal aorta.  The graft appears patent.  There is some hyperdensity external to the graft in the aortic lumen concerning for endoleak as well as mural calcification.  The diameter of the native descending aorta measures up to 7.4 cm. VASCULATURE:  No gross evidence for acute pulmonary embolism.  Evaluation of segmental and subsegmental pulmonary arteries in the mid-lungs and bases is limited by motion artifact..   ABDOMEN/PELVIS: LIVER:  Homogeneous enhancement. BILIARY:  No biliary ductal dilatation. PANCREAS:  Homogeneous enhancement. SPLEEN:  Normal caliber. KIDNEYS:  No hydronephrosis .  No suspicious renal mass.  Symmetric nephrograms.  ADRENALS:  Normal. AORTA/VASCULAR:  Postsurgical changes of stents at the origin of the celiac SMA bilateral main renal arteries.  These appear patent.  There is a aorto bi-iliac stent graft in place which appears patent.  There is a left common iliac external iliac graft which appears patent.  There is a small intimal dissection flap at the and of the left common femoral stent.  RETROPERITONEUM:  No enlarged adenopathy. BOWEL/MESENTERY:  Normal caliber bowel loops. Uncomplicated colonic diverticulosis ABDOMINAL WALL:  No ventral wall hernia. PELVIC ORGANS:  Schuster catheter in  the urinary bladder.  BONES:  Mild degenerative changes in the lower lumbar facets.             CONCLUSION:  Postsurgical changes of aortic dissection repair.  There is a stent graft in the ascending and descending thoracic aorta which is patent.  Probable endoleak at the descending thoracic aorta level.  Patent stents involving the celiac, SMA and main renal artery origins.  Patent aorto bi-iliac stent grafts.  There is a small dissection flap involving the left distal common femoral artery.  There are dissection flaps extending into the right innominate artery, right common carotid artery, left common carotid and subclavian arteries.  Symmetric nephrograms.  LOCATION:  [facility]   Dictated by (CST): Lissa Tavares MD on 12/11/2024 at 11:33 AM     Finalized by (CST): Lissa Tavares MD on 12/11/2024 at 11:42 AM       XR CHEST AP PORTABLE  (CPT=71045)    Result Date: 12/10/2024  PROCEDURE:  XR CHEST AP PORTABLE  (CPT=71045)  TECHNIQUE:  AP chest radiograph was obtained.  COMPARISON:  None.  INDICATIONS:  CVC line pacement in OR  PATIENT STATED HISTORY: (As transcribed by Technologist)   CVC line pacement               CONCLUSION:   Postoperative changes of cardiothoracic surgery including placement of aortic stent graft.  Heart size upper limits normal.  Findings of pulmonary venous hypertension with mild interstitial pulmonary edema.  No discrete airspace consolidation.  The pleural spaces are clear.  Right IJ sheath in place with central venous catheter terminating in the mid SVC.   LOCATION:  [id number]      Dictated by (CST): Apurva Machado MD on 12/10/2024 at 3:09 PM     Finalized by (CST): Apurva Machado MD on 12/10/2024 at 3:10 PM       CTA CHEST CTA ABDOMEN CTA PELVIS (CPT=71275/97694)    Result Date: 12/3/2024  PROCEDURE: CTA CHEST CTA ABDOMEN CTA PELVIS (CPT=71275/05027)  COMPARISON: [facility], CT PF CHST ABD PELV W BEHZADAS*, 1/21/2006, 4:08 PM.  INDICATIONS: Dissection of ascending aorta  TECHNIQUE:    CT images of the chest, abdomen and pelvis were obtained without and with non-ionic intravenous contrast material.  Multi-planar reformatted and 3-D images were created with vessel analysis performed on an independent workstation.  Automated  exposure control for dose reduction was used. Adjustment of the mA and/or kV was done based on the patient's size. Use of iterative reconstruction technique for dose reduction was used. Dose information is transmitted to the ACR (American College of Radiology) NRDR (National Radiology Data Registry) which includes the Dose Index Registry.  FINDINGS:    AORTA: Postoperative changes of repair of the ascending aorta within endovascular stent graft seen extending from the proximal aortic arch into the descending thoracic aorta.  There is a persistent type B dissection seen involving the distal thoracic aorta extending into the abdominal aorta and involving the left common iliac artery.  There is an aneurysm of the proximal suprarenal abdominal aorta measuring 4.7 x 4.8 cm.  There is thrombus seen within the native thoracic aorta aneurysm sac which measures up to 6 cm.  There is extensive calcification within the posterior aneurysm sac of the abdominal aorta.  No endoleak.  5 cm aneurysm of the aortic root. ILIAC ARTERIES: Right common iliac artery is unremarkable.  No occlusion of the left iliac artery. FEMORAL ARTERIES:  Patent OTHER VESSELS: Dissection flap extends into the left subclavian artery which is unchanged since 1/21/2006.  Dissection flap also seen extending into the right brachiocephalic and bilateral common carotid arteries which is new.  Celiac axis and SMA and CANDIDA arise from the true lumen.  Right renal artery supplied by the true lumen.  Left renal artery supplied by the true and false lumen.  LINES AND TUBES: None.  MEDIASTINUM: Cardiac chambers are enlarged.  There are coronary artery calcifications.  Median sternotomy wires. sternotomy wires.  No mediastinal  lymphadenopathy.  No saddle embolus or main pulmonary embolus.  LUNGS AND PLEURA: There is bibasilar and lingular atelectasis and scarring. No pneumothorax.  No consolidation.  No pleural effusion. The trachea and central airways are unremarkable.  CHEST WALL/BONES: There is degenerative disease of the thoracic spine. The chest wall and osseous structures are otherwise unremarkable.  LIVER: Diffuse low attenuation seen throughout the liver compatible with fatty infiltration. GALLBLADDER: Normal wall thickness.  No pericholecystic fluid. BILIARY: No intra-or extrahepatic biliary ductal dilation. SPLEEN: Unremarkable PANCREAS: The pancreas enhances symmetrically. No ductal dilation. ADRENALS: Unremarkable KIDNEYS: The kidneys enhance symmetrically. There is no hydronephrosis.  Lobulated appearance of the bilateral renal cortices with cortical defects suggestive of prior scarring. RETROPERITONEUM: No mass or enlarged adenopathy.  BOWEL:  No dilation or wall thickening. The appendix is normal. There are no inflammatory changes within the right lower quadrant.  MESENTERY: Normal.  No mass or hernia.   PELVIS: No enlarged mass or adenopathy. No bladder wall thickening. BONES:   There is degenerative disease of the thoracic and lumbar spine. Degenerative changes are seen within the sacroiliac joints and pubic symphysis. Degenerative changes are seen in the bilateral hips. Postoperative changes of intramedullary nail with locking screw in the right femoral neck.         CONCLUSION:   Postoperative changes of repair of prior type A dissection.  No endoleak.  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  Dissection flap extending into the left subclavian artery is unchanged since 1/21/2006.  Dissection flap extending into the right brachiocephalic and bilateral common carotid arteries is new since 1/21/2006.  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     Dictated by (CST): Josh Valencia MD on 12/03/2024 at 2:54 PM     Finalized by (CST): Josh Valencia MD on 12/03/2024 at 3:07 PM            Operative reports:  Procedure(s) (LRB):  ENDOVASCULAR ABDOMINAL AORTIC ANEURYSM  STENT GRAFT REPAIR WITH FENESTRATED DEVICE, NEUROMONITORING. FOR MORE INFO SEE CATH LAB CHARTING (N/A)    Hospital course:    Patient is a 69 year old female with PMH sig for hypertension, CVA, PAD abdominal aneurysm who presents for elective repair.     Abdominal aortic aneurysm  Status post endovascular repair of abdominal aortic aneurysm 12/10  Postoperative pain  - asa, statin        Hypertension  - bp controlled post op off meds. Parameters per vascular      Thrombocytopenia- plts improved, likely drop reactive    Day of discharge exam:  Vitals:    12/16/24 0420   BP: 149/59   Pulse: 78   Resp:    Temp:      No cp, dizziness, sob    No acute distress, alert and oriented   Lungs clear  Heart regular  Abdomen benign    Total time coordinating care 32 min       Patient and/or family had opportunity to ask questions and expressed understanding and agreement with therapeutic plan as outlined         [facility] Hospitalist  [phone]  Answering Service: [phone]

## 2024-12-16 NOTE — PLAN OF CARE
Patient has been cleared for discharge. Education provided on follow up appointment and new medications, patient and daughter verbalized understanding. All questions have been answered. Patient transported off unit via wheelchair.       Problem: Patient/Family Goals  Goal: Patient/Family Long Term Goal  Description: Patient's Long Term Goal: \"stay out of the hospital\"    Interventions:  - Surgical intervention, BP monitoring    - See additional Care Plan goals for specific interventions  12/16/2024 1500 by Maria Alejandra Boyd RN  Outcome: Adequate for Discharge  12/16/2024 1403 by Maria Alejandra Boyd RN  Outcome: Progressing  Goal: Patient/Family Short Term Goal  Description: Patient's Short Term Goal: \"control my back pain better and avoid future surgeries\"\"    Interventions:   - Pain assessment and treatment with PO pain medication  - See additional Care Plan goals for specific interventions  12/16/2024 1500 by Maria Alejandra Boyd RN  Outcome: Adequate for Discharge  12/16/2024 1403 by Maria Alejandra Boyd RN  Outcome: Progressing     Problem: CARDIOVASCULAR - ADULT  Goal: Maintains optimal cardiac output and hemodynamic stability  Description: INTERVENTIONS:  - Monitor vital signs, rhythm, and trends  - Monitor for bleeding, hypotension and signs of decreased cardiac output  - Evaluate effectiveness of vasoactive medications to optimize hemodynamic stability  - Monitor arterial and/or venous puncture sites for bleeding and/or hematoma  - Assess quality of pulses, skin color and temperature  - Assess for signs of decreased coronary artery perfusion - ex. Angina  - Evaluate fluid balance, assess for edema, trend weights  12/16/2024 1500 by Maria Alejandra Boyd RN  Outcome: Adequate for Discharge  12/16/2024 1403 by Maria Alejandra Boyd RN  Outcome: Progressing  Goal: Absence of cardiac arrhythmias or at baseline  Description: INTERVENTIONS:  - Continuous cardiac monitoring, monitor vital signs, obtain 12 lead EKG if indicated  - Evaluate effectiveness  of antiarrhythmic and heart rate control medications as ordered  - Initiate emergency measures for life threatening arrhythmias  - Monitor electrolytes and administer replacement therapy as ordered  12/16/2024 1500 by Maria Alejandra Boyd RN  Outcome: Adequate for Discharge  12/16/2024 1403 by Maria Alejandra Boyd RN  Outcome: Progressing     Problem: PAIN - ADULT  Goal: Verbalizes/displays adequate comfort level or patient's stated pain goal  Description: INTERVENTIONS:  - Encourage pt to monitor pain and request assistance  - Assess pain using appropriate pain scale  - Administer analgesics based on type and severity of pain and evaluate response  - Implement non-pharmacological measures as appropriate and evaluate response  - Consider cultural and social influences on pain and pain management  - Manage/alleviate anxiety  - Utilize distraction and/or relaxation techniques  - Monitor for opioid side effects  - Notify MD/LIP if interventions unsuccessful or patient reports new pain  - Anticipate increased pain with activity and pre-medicate as appropriate  12/16/2024 1500 by Maria Alejandra Body RN  Outcome: Adequate for Discharge  12/16/2024 1403 by Maria Alejandra Boyd RN  Outcome: Progressing

## 2024-12-16 NOTE — PHYSICAL THERAPY NOTE
PHYSICAL THERAPY TREATMENT NOTE - INPATIENT    Room Number: 8612/8612-A     Session: 2/5     Number of Visits to Meet Established Goals: 5    Presenting Problem: s/p endovascular abdominal aortic aneurysm stent graft repair with fenestrated device 12/10  Co-Morbidities : descending thoracic aneurysm, PVD, TIA, HTN, depression    History related to current admission: Patient is a 69 year old female admitted on 12/10/2024: s/p endovascular abdominal aortic aneurysm stent graft repair with fenestrated device.      12/11 vascular surg \"Up to chair and ambulate in room \"     HOME SITUATION  Type of Home: Apartment  Home Layout: One level  Stairs to Enter : 12   Railing: Yes              Lives With: Daughter    Drives: No   Patient Regularly Uses: Glasses;Cane;Rolling walker       Prior Level of [place]: Uses RW and SPC interchangable but always has the SPC on the stairs, had a fall in May broke her femur walking out of her apt, dtr does IADLs (dtr lives in [place] but has been staying with her), another fall reported x2 weeks ago (lost balance on SPC)       PHYSICAL THERAPY ASSESSMENT   Patient demonstrates good  progress this session, goals  updated to reflect patient performance.      Patient is requiring supervision and contact guard assist as a result of the following impairments: decreased endurance/aerobic capacity, impaired static/dynamic standing balance, and decreased muscular endurance.     Patient continues to function below baseline with transfers, gait, stair negotiation, standing prolonged periods, and performing household tasks.  Next session anticipate patient to progress gait and standing prolonged periods.  Physical Therapy will continue to follow patient for duration of hospitalization.    Patient continues to benefit from continued skilled PT services: at discharge to promote prior level of function and safety with additional support and return home with home health PT.    PLAN DURING  HOSPITALIZATION  Nursing Mobility Recommendation : 1 Assist  PT Device Recommendation: Rolling walker  PT Treatment Plan: Bed mobility;Body mechanics;Coordination;Endurance;Energy conservation;Patient education;Family education;Gait training;Neuromuscular re-educate;Range of motion;Strengthening;Stoop training;Stair training;Transfer training;Balance training  Frequency (Obs): 3-5x/week     CURRENT GOALS     Goal #1 Patient is able to demonstrate supine - sit EOB @ level: supervision   met - upgrade to mod I   Goal #2 Patient is able to demonstrate transfers EOB to/from Chair/Wheelchair at assistance level: supervision - met       Goal #3 Patient is able to ambulate 150 feet with assist device: walker - rolling at assistance level: supervision- met       Goal #4 Patient is able to navigate stairs with rail and SBA-ongoing- currently needing CGA and cues for safe use of cane on steps   Goal #5     Goal #6     Goal Comments: Goals established on 2024-updated 24    SUBJECTIVE  \"I hope I am going home today\"    OBJECTIVE  Precautions: Bed/chair alarm (-160)    WEIGHT BEARING RESTRICTION     PAIN ASSESSMENT   Ratin  Location: denies pn  Management Techniques: Activity promotion;Repositioning    BALANCE                                                                                                                       Static Sitting: Good  Dynamic Sitting: Fair +           Static Standing: Fair -  Dynamic Standing: Poor +    ACTIVITY TOLERANCE                         O2 WALK       AM-PAC '6-Clicks' INPATIENT SHORT FORM - BASIC MOBILITY  How much difficulty does the patient currently have...  Patient Difficulty: Turning over in bed (including adjusting bedclothes, sheets and blankets)?: None   Patient Difficulty: Sitting down on and standing up from a chair with arms (e.g., wheelchair, bedside commode, etc.): None   Patient Difficulty: Moving from lying on back to sitting on the side of  the bed?: None   How much help from another person does the patient currently need...   Help from Another: Moving to and from a bed to a chair (including a wheelchair)?: None   Help from Another: Need to walk in hospital room?: A Little   Help from Another: Climbing 3-5 steps with a railing?: A Little     AM-PAC Score:  Raw Score: 22   Approx Degree of Impairment: 20.91%   Standardized Score (AM-PAC Scale): 53.28   CMS Modifier (G-Code): CJ    FUNCTIONAL ABILITY STATUS  Gait Assessment   Functional Mobility/Gait Assessment  Gait Assistance: Supervision  Distance (ft): 100;100  Assistive Device: Rolling walker  Pattern: Shuffle;Comment (short steps/stride length)  Stairs: Stairs  How Many Stairs: 6  Device: 1 Rail;Cane  Assist: Contact guard assist  Pattern: Ascend and Descend  Ascend and Descend : Step to    Skilled Therapy Provided  Pt presents to PT lying supine in bed. Educated regarding safe t/f and body mechanics while completing bed mobility. Pt log rolled from supine to the EOB c mod indep. She is able to scoot and reposition safely. Sit/stand c mod indep and RW. Following this, pt able to amb 100'' c Rw and supervision, c above gait deviations to the stair case. This writer carried her cane to practice stair negotiation. She was able to complete 6 steps c 1 HR, cane, step to pattern c CGA. Required several reminders regarding safe use of cane on steps and proper sequencing. Following this, pt amb 100' back to her room c RW and supervision. She then returned to supine in bed c mod indep. RN aware of session.    Bed Mobility:  Rolling: mod indep   Supine<>Sit: mod indep   Sit<>Supine: mod indep     Transfer Mobility:  Sit<>Stand: supervision c RW   Stand<>Sit: supervision c RW   Gait: 'x2 c supervision    Therapist's Comments: cues for safe use of RW/cane while amb and negotiating stairs. Also d/w pt to utilize a standing rest break when she returns home if she is tired prior to attempting additional  stairs into apt.       Patient End of Session: In bed;Needs met;Call light within reach;RN aware of session/findings;All patient questions and concerns addressed;Hospital anti-slip socks    PT Session Time: 30 minutes  Gait Training: 15 minutes  Therapeutic Activity: 10 minutes

## 2024-12-16 NOTE — HOME CARE LIAISON
Received referral via [facility] for Home Health services. Spoke w/ patient at the bedside and remain agreeable for all home health care services

## 2024-12-16 NOTE — PLAN OF CARE
Assumed care of patient at 1930. Aox4. RA, SOB at times, [place]. NSR. Pt reports surgical site pain. Incisions to R+L groin, with localized bruising . 1x w/walker. Continent 2x , reports last BM was 12/11~, declined laxative medication when offered and informed of purpose. Bed locked and in lowest position. Call light and personal items within reach.           Problem: Patient/Family Goals  Goal: Patient/Family Long Term Goal  Description: Patient's Long Term Goal: \"stay out of the hospital\"    Interventions:  - Surgical intervention, BP monitoring    - See additional Care Plan goals for specific interventions  Outcome: Progressing  Goal: Patient/Family Short Term Goal  Description: Patient's Short Term Goal: \"control my back pain better and avoid future surgeries\"\"    Interventions:   - Pain assessment and treatment with PO pain medication  - See additional Care Plan goals for specific interventions  Outcome: Progressing     Problem: CARDIOVASCULAR - ADULT  Goal: Maintains optimal cardiac output and hemodynamic stability  Description: INTERVENTIONS:  - Monitor vital signs, rhythm, and trends  - Monitor for bleeding, hypotension and signs of decreased cardiac output  - Evaluate effectiveness of vasoactive medications to optimize hemodynamic stability  - Monitor arterial and/or venous puncture sites for bleeding and/or hematoma  - Assess quality of pulses, skin color and temperature  - Assess for signs of decreased coronary artery perfusion - ex. Angina  - Evaluate fluid balance, assess for edema, trend weights  Outcome: Progressing  Goal: Absence of cardiac arrhythmias or at baseline  Description: INTERVENTIONS:  - Continuous cardiac monitoring, monitor vital signs, obtain 12 lead EKG if indicated  - Evaluate effectiveness of antiarrhythmic and heart rate control medications as ordered  - Initiate emergency measures for life threatening arrhythmias  - Monitor electrolytes and administer replacement therapy as  ordered  Outcome: Progressing     Problem: PAIN - ADULT  Goal: Verbalizes/displays adequate comfort level or patient's stated pain goal  Description: INTERVENTIONS:  - Encourage pt to monitor pain and request assistance  - Assess pain using appropriate pain scale  - Administer analgesics based on type and severity of pain and evaluate response  - Implement non-pharmacological measures as appropriate and evaluate response  - Consider cultural and social influences on pain and pain management  - Manage/alleviate anxiety  - Utilize distraction and/or relaxation techniques  - Monitor for opioid side effects  - Notify MD/LIP if interventions unsuccessful or patient reports new pain  - Anticipate increased pain with activity and pre-medicate as appropriate  Outcome: Progressing

## 2024-12-16 NOTE — OPERATIVE REPORT
Pre-Operative Diagnosis:   1.  Thoracoabdominal aortic aneurysm with dissection     Post-Operative Diagnosis:   1.  Thoracoabdominal aortic aneurysm with dissection        Procedure Performed:   1.  Ultrasound-guided access of the bilateral common femoral arteries  2. Introduction of catheter into aorta  3.  Thoracic aortogram and abdominal aortogram with radiologic supervision and interpretation  4.  Intravascular ultrasound of the right common iliac artery, right external iliac artery, left external iliac artery, left common iliac artery, and aorta with radiologic supervision and interpretation  5.  Thoracic endovascular aortic repair delayed after initial placement with proximal extension-Cook alpha 40 x 36 x 209 thoracic stent graft  6.  Thoracic endovascular aortic repair delayed after initial placement with distal extension-Cook alpha 40 x 167 thoracic stent graft  7.  Fenestrated endovascular aortic repair with 4 fenestrations CPT 61008  Cook Zenith flex 36 x 131  Celiac 7 x 38 iCast  SMA 7 x 38 iCast  Left renal 7x22 iCast  Right renal 6x22 iCast  Left ipsilateral 20 x 74 iliac extension  Right contralateral 13 x 122 iliac extension  8.  Left external iliac artery angioplasty and stenting with 10 x 80 protégé stent postdilated with 8 mm balloon  9.  Closure of bilateral groin with Pro-glide Perclose suture (20 Sami right, 22 Sami left).           Co-Surgeons:  MD Moe Aquino MD     Due to the complexity of the operation given the extent of the thoracoabdominal dissection, need for  thermal septotomy and fenestrated repair Dr. Hoffman requested I participate as a co-surgeon for this operation.     Anesthesia:   General        EBL: 200 cc     Complications: None     Drains: None     Findings: Successful septotomy performed.  Graft relined above.  Fenestrated repair performed with wide patency of all visceral's and exclusion of the dissection and aneurysm.  Palpable pulses at  [FreeTextEntry1] : Blood work reviewed. Vitamin D 21 and he reported that he has not been consistent with his vitamin d supplement. Encouraged him to add weight bearing exercises to increase his HDL. completion.  Neurovascularly intact at completion.     Indications for procedure: This is a 69-year-old female who presented with thoracoabdominal aortic aneurysm with dissection.  She underwent previous ascending repair with the branching and subsequent thoracic endovascular aortic repair back in 2016.  She was found recently to have growth of the aneurysm from 5.2 cm to 7.5 cm since this past May.  Dr. Hoffman informed her that there is no stent commercially available.  She was too frail for open surgery and her options were nonoperative management versus a fenestrated endovascular aortic repair.  I discussed the risks and benefits of the procedure not limited to the risk of bleeding, infection, stroke, MI, rupture, spinal cord ischemia and death.  Informed consent was directly obtained.  On the morning of December 10 the patient was brought to the operating room and placed in supine position.  General anesthesia was induced without any issues.  A Schuster catheter and arterial line were placed.  Neuromonitoring was initiated.  Antibiotics were administered.  The patient was subsequently prepped and draped in the usual sterile fashion.  Time was performed.     With the use the ultrasound Dr. Hoffman accessed the right common femoral with advancement of a microwire and exchanged for a micro sheath.  A Glidewire advantage was then put in place followed by a 6 Angolan sheath.  Dr. Hoffman then deployed Pro-glide Perclose sutures and the 10 and 2 o'clock position followed by placement of an 8 Angolan sheath. Dr. Hoffman performed this on the right and I performed this on the left.     Dr. Hoffman then performed intravascular ultrasound through the right external iliac, right common iliac and aorta and confirmed placement into the true lumen all the way to the thoracic aorta.  Similarly I performed intravascular ultrasound in the left external iliac, left common iliac and aorta and confirmed entry into the true lumen.   We then upsized our sheaths to a 20 Citizen of [place] sheath on the right by Dr. Hoffman and 22 Citizen of [place] sheath on the left by me.   then placed a piyush wire in the right groin and advanced a Krista 4 catheter and Glidewire into the false lumen and directed this into the descending thoracic aorta just above the previous existing stent.  Dr. Hoffman then engaged the fenestration in the septum and then placed the wire into the true lumen and was able to externalize this into the left sheath.  We then exchanged for a Arreguin cross catheter and then placed a shaped Astato wire with stripping of the coating.  Catheters were placed by me on the right followed by me on the left insulate the catheter with only exposure of the uncoated segment.  A thermal septotomy was performed from the descending thoracic aorta all the way to the aortic bifurcation and this was performed without any issues.  Intravascular ultrasound that was repeated confirmed dramatic improvement of flow in the true lumen and this was performed in order to allow expansion of the graft as well as communication between the true lumen and the left renal artery.  At this point we opted to proceed with the remainder of the repair.     From the right groin Dr. Hoffman advanced the proximal thoracic stent graft to exclude the old graft and deployed a 40 x 36 alpha thoracic stent graft proximally with proximal extension and deployed this without any issues.     Dr. Hoffman then advanced a the fenestrated device from the left. I then on sheath this with exposure of the gate.  Dr. Hoffman cannulated the great from the right and advanced the 20 Citizen of [place] sheath into the main body of the graft.  Dr. Hoffman then utilized a Krista 4 catheter and Glidewire to engage the fenestration and select the superior mesenteric artery with advancement of the catheter into the target vessel and confirmed this with contrast injection.  Dr. Hoffman placed a Matias wire in this.    Yasmin performed the similarly in the right renal.  I then performed this on the left renal and the celiac.  A molding and occluding aortic balloon was then used to pop the diameter reducing ties thereby fully deploying the graft.  Dr. Hoffman placed 6 Burmese sheath in the bilateral renal arteries with placement of 6 x 22 iCast on the right and left with 7 x 22 iCast.  I then deployed the remainder of the graft on the left and then exchanged for a 40 mm alpha thoracic stent graft and deployed this as a bridge piece with distal extension to just above the celiac artery without any issues.  A molding and occluding aortic balloon was used to profile the proximal and distal segments.  Dr Hoffman advanced this and then he inflated the balloon to profile the entirety of this.     We then proceeded with the visceral stenting.  Dr. Hoffman pulled the sheath back in the left renal and deployed a 7 x 22 iCast without any issues.  He then flared this with a 9 mm balloon.  Repeat angiogram revealed wide patency with no leak.  The wire and catheter were removed.  I then withdrew the sheath in the right renal with exposure of the sheath and deployed this without any issues.  This was then flared with a 9 mm balloon and repeat angiogram revealed wide patency with no kink and no leak the wire and catheter were then removed.  Sheath was then placed in the SMA followed by a 7 x 22 iCast.  Dr. Hoffman deployed this inflated this without any issues.  Repeat angiogram revealed inadequate purchase and thus a 7 x 38 iCast was deployed and extended without any issues.  This was flared with 9 mm balloon.  Repeat angiogram revealed wide patency with no endoleak.  The wire and catheter removed.  Similarly I placed the 7 Burmese sheath in the celiac and a 7 x 38 iCast which was deployed without any issues and flared with 9 mm balloon.  Repeat angiogram revealed wide patency with no leak and subsequently removed.  Dr. Hoffman then  deployed a 13mm right iliac limb all the way to the distal common iliac artery without any issues. I performed the iliac extension on the left with 20 mm extension to the distal left common iliac without any issues.  A molding occluding aortic balloon was used to profile the proximal distal and overlapping segments.  A 10 mm protégé stent was deployed by me in the left external iliac due to residual dissection and postdilated with 8 mm balloon.  Repeat angiogram revealed wide patency with no endoleak and flow into all visceral vessels.  At this point we were satisfied.  We confirmed with intravascular ultrasound complete resolution and an adequate graft expansion.  At this point we opted to terminate the procedure.  All catheters were withdrawn.  We then the deployed the Pro-glide Perclose sutures which were cinched locked and cut thereby closing the arteriotomy site.  Manual pressure was held.  Protamine was administered.  Upon completion there was evidence of excellent hemostasis and a sterile dressing was applied.  The patient awoke from general anesthesia was extubated and taken to the ICU in stable condition.  At completion he was noted to have palpable pulses and was found to be neurovascularly intact.

## 2024-12-16 NOTE — CM/SW NOTE
12/16/24 0939   Choice of Post-Acute Provider   Informed patient of right to choose their preferred provider Yes   List of appropriate post-acute services provided to patient/family with quality data Yes   [facility]family choice [facility]   Information given to Patient       SW met with pt at bedside to work on discharge planning. Pt anticipates that she'll be discharging today. Pt agreeable to HH at discharge. Pt has list, agreeable to [facility]. Reserved in [facility] and liaison updated. Pt reports that her daughter will  pt at discharge.     [facility]  P:[phone]  F:[phone]    Rekha ABERNATHY, LSW  Discharge Planner

## 2024-12-18 NOTE — PAYOR COMM NOTE
--------------  DISCHARGE REVIEW    Payor: [employer]  Subscriber #:  [id number]  Authorization Number: [id number]    Admit date: 12/10/24  Admit time:   5:57 AM  Discharge Date: 12/16/2024  3:51 PM     Admitting Physician: Hamlet Hoffman MD  Attending Physician:  Nikki zepeda providers found  Primary Care Physician: Sb Villa DO          Discharge Summary Notes        Discharge Summary signed by Barrett Barrientos DO at 12/16/2024 10:33 PM       Author: Barrett Barrientos DO Specialty: Internal Medicine Author Type: Physician    Filed: 12/16/2024 10:33 PM Date of Service: 12/16/2024  7:09 AM Status: Signed    : Barrett Barrientos DO (Physician)         [facility] Discharge Summary    Patient ID  Sherri Gillette  [id number]  69 year old  10/19/1955    Admit date: 12/10/2024    Discharge date: 12/16/24    Attending: Hamlet Hoffman MD     Primary Care Physician: Sb Villa DO      Reason for admission: electvie surgery    Discharge condition: stable    Disposition: home    Important follow up:  -PCP within 7 d  -specialists:  vascular as directed    -labs:    -radiology:      Additional patient instructions       Discharge med list     Medication List        ASK your doctor about these medications      aspirin 81 MG Tbec     lisinopril 2.5 MG Tabs  Commonly known as: Prinivil; Zestril     metoprolol tartrate 25 MG Tabs  Commonly known as: Lopressor  Take one tablet by mouth twice daily              Discharge Diagnoses:      Abdominal aortic aneurysm  Status post endovascular repair of abdominal aortic aneurysm 12/10  Postoperative pain  Hypertension     Consults:  IP CONSULT TO RESPIRATORY CARE  IP CONSULT TO HOSPITALIST  IP CONSULT TO CRITICAL CARE INTENSIVIST  IP CONSULT TO SOCIAL WORK  IP CONSULT TO SOCIAL WORK    Radiology:  CTA CHEST CTA ABDOMEN CTA PELVIS (CPT=71275/45362)    Result Date: 12/11/2024  PROCEDURE:  CTA CHEST CTA ABDOMEN CTA PELVIS (CPT=71275/19068)  COMPARISON:   None.  INDICATIONS:  post dissection repair  TECHNIQUE:  CT (with CTA imaging) of the chest, abdomen, and pelvis were obtained. Multi-planar reformatted/3-D images were created to optimize visualization of vascular anatomy.  Dose reduction techniques were used. Dose information is transmitted to the ACR (American College of Radiology) NRDR (National Radiology Data Registry) which includes the Dose Index Registry.  PATIENT STATED HISTORY:(As transcribed by Technologist)  Patient states post dissection repair.   CONTRAST USED:  100cc of Isovue 370  FINDINGS:   CHEST:  LUNGS:  Exam is degraded by respiratory motion.  There is scattered bilateral subsegmental atelectasis in the lung bases and left upper lobe.  Slight reticular interstitial opacity in the right upper lobe anteriorly likely related to post radiation change.  MEDIASTINUM:  No enlarged mediastinal or hilar adenopathy CARDIAC:  Cardiomegaly.  No significant pericardial effusion  PLEURA:  No pneumothorax or effusion.  CHEST WALL:  Right mastectomy AORTA:  Postsurgical changes of aortic dissection repair.  There are intimal flaps consistent with dissections extending into the right innominate artery and right common carotid artery as well as left common carotid and left subclavian arteries.  There is a stent graft which is patent arising from the ascending and continuing through the descending thoracic aorta into the abdominal aorta.  The graft appears patent.  There is some hyperdensity external to the graft in the aortic lumen concerning for endoleak as well as mural calcification.  The diameter of the native descending aorta measures up to 7.4 cm. VASCULATURE:  No gross evidence for acute pulmonary embolism.  Evaluation of segmental and subsegmental pulmonary arteries in the mid-lungs and bases is limited by motion artifact..   ABDOMEN/PELVIS: LIVER:  Homogeneous enhancement. BILIARY:  No biliary ductal dilatation. PANCREAS:  Homogeneous enhancement.  SPLEEN:  Normal caliber. KIDNEYS:  No hydronephrosis .  No suspicious renal mass.  Symmetric nephrograms.  ADRENALS:  Normal. AORTA/VASCULAR:  Postsurgical changes of stents at the origin of the celiac SMA bilateral main renal arteries.  These appear patent.  There is a aorto bi-iliac stent graft in place which appears patent.  There is a left common iliac external iliac graft which appears patent.  There is a small intimal dissection flap at the and of the left common femoral stent.  RETROPERITONEUM:  No enlarged adenopathy. BOWEL/MESENTERY:  Normal caliber bowel loops. Uncomplicated colonic diverticulosis ABDOMINAL WALL:  No ventral wall hernia. PELVIC ORGANS:  Schuster catheter in the urinary bladder.  BONES:  Mild degenerative changes in the lower lumbar facets.             CONCLUSION:  Postsurgical changes of aortic dissection repair.  There is a stent graft in the ascending and descending thoracic aorta which is patent.  Probable endoleak at the descending thoracic aorta level.  Patent stents involving the celiac, SMA and main renal artery origins.  Patent aorto bi-iliac stent grafts.  There is a small dissection flap involving the left distal common femoral artery.  There are dissection flaps extending into the right innominate artery, right common carotid artery, left common carotid and subclavian arteries.  Symmetric nephrograms.  LOCATION:  [facility]   Dictated by (CST): Lissa Tavares MD on 12/11/2024 at 11:33 AM     Finalized by (CST): Lissa Tavares MD on 12/11/2024 at 11:42 AM       XR CHEST AP PORTABLE  (CPT=71045)    Result Date: 12/10/2024  PROCEDURE:  XR CHEST AP PORTABLE  (CPT=71045)  TECHNIQUE:  AP chest radiograph was obtained.  COMPARISON:  None.  INDICATIONS:  CVC line pacement in OR  PATIENT STATED HISTORY: (As transcribed by Technologist)   CVC line pacement               CONCLUSION:   Postoperative changes of cardiothoracic surgery including placement of aortic stent graft.  Heart size upper  limits normal.  Findings of pulmonary venous hypertension with mild interstitial pulmonary edema.  No discrete airspace consolidation.  The pleural spaces are clear.  Right IJ sheath in place with central venous catheter terminating in the mid SVC.   LOCATION:  [id number]      Dictated by (CST): Apurva Machado MD on 12/10/2024 at 3:09 PM     Finalized by (CST): Apurva Machado MD on 12/10/2024 at 3:10 PM       CTA CHEST CTA ABDOMEN CTA PELVIS (CPT=71275/92003)    Result Date: 12/3/2024  PROCEDURE: CTA CHEST CTA ABDOMEN CTA PELVIS (CPT=71275/74934)  COMPARISON: [facility], CT PF CHST ABD PELV W CONTRAS*, 1/21/2006, 4:08 PM.  INDICATIONS: Dissection of ascending aorta  TECHNIQUE:   CT images of the chest, abdomen and pelvis were obtained without and with non-ionic intravenous contrast material.  Multi-planar reformatted and 3-D images were created with vessel analysis performed on an independent workstation.  Automated  exposure control for dose reduction was used. Adjustment of the mA and/or kV was done based on the patient's size. Use of iterative reconstruction technique for dose reduction was used. Dose information is transmitted to the ACR (American College of Radiology) NRDR (National Radiology Data Registry) which includes the Dose Index Registry.  FINDINGS:    AORTA: Postoperative changes of repair of the ascending aorta within endovascular stent graft seen extending from the proximal aortic arch into the descending thoracic aorta.  There is a persistent type B dissection seen involving the distal thoracic aorta extending into the abdominal aorta and involving the left common iliac artery.  There is an aneurysm of the proximal suprarenal abdominal aorta measuring 4.7 x 4.8 cm.  There is thrombus seen within the native thoracic aorta aneurysm sac which measures up to 6 cm.  There is extensive calcification within the posterior aneurysm sac of the abdominal aorta.  No endoleak.  5 cm aneurysm of the aortic  root. ILIAC ARTERIES: Right common iliac artery is unremarkable.  No occlusion of the left iliac artery. FEMORAL ARTERIES:  Patent OTHER VESSELS: Dissection flap extends into the left subclavian artery which is unchanged since 1/21/2006.  Dissection flap also seen extending into the right brachiocephalic and bilateral common carotid arteries which is new.  Celiac axis and SMA and CANDIDA arise from the true lumen.  Right renal artery supplied by the true lumen.  Left renal artery supplied by the true and false lumen.  LINES AND TUBES: None.  MEDIASTINUM: Cardiac chambers are enlarged.  There are coronary artery calcifications.  Median sternotomy wires. sternotomy wires.  No mediastinal lymphadenopathy.  No saddle embolus or main pulmonary embolus.  LUNGS AND PLEURA: There is bibasilar and lingular atelectasis and scarring. No pneumothorax.  No consolidation.  No pleural effusion. The trachea and central airways are unremarkable.  CHEST WALL/BONES: There is degenerative disease of the thoracic spine. The chest wall and osseous structures are otherwise unremarkable.  LIVER: Diffuse low attenuation seen throughout the liver compatible with fatty infiltration. GALLBLADDER: Normal wall thickness.  No pericholecystic fluid. BILIARY: No intra-or extrahepatic biliary ductal dilation. SPLEEN: Unremarkable PANCREAS: The pancreas enhances symmetrically. No ductal dilation. ADRENALS: Unremarkable KIDNEYS: The kidneys enhance symmetrically. There is no hydronephrosis.  Lobulated appearance of the bilateral renal cortices with cortical defects suggestive of prior scarring. RETROPERITONEUM: No mass or enlarged adenopathy.  BOWEL:  No dilation or wall thickening. The appendix is normal. There are no inflammatory changes within the right lower quadrant.  MESENTERY: Normal.  No mass or hernia.   PELVIS: No enlarged mass or adenopathy. No bladder wall thickening. BONES:   There is degenerative disease of the thoracic and lumbar spine.  Degenerative changes are seen within the sacroiliac joints and pubic symphysis. Degenerative changes are seen in the bilateral hips. Postoperative changes of intramedullary nail with locking screw in the right femoral neck.         CONCLUSION:   Postoperative changes of repair of prior type A dissection.  No endoleak.  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  Dissection flap extending into the left subclavian artery is unchanged since 1/21/2006.  Dissection flap extending into the right brachiocephalic and bilateral common carotid arteries is new since 1/21/2006.  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     Dictated by (CST): Josh Valencia MD on 12/03/2024 at 2:54 PM     Finalized by (CST): Josh Valencia MD on 12/03/2024 at 3:07 PM            Operative reports:  Procedure(s) (LRB):  ENDOVASCULAR ABDOMINAL AORTIC ANEURYSM  STENT GRAFT REPAIR WITH FENESTRATED DEVICE, NEUROMONITORING. FOR MORE INFO SEE CATH LAB CHARTING (N/A)    Hospital course:    Patient is a 69 year old female with PMH sig for hypertension, CVA, PAD abdominal aneurysm who presents for elective repair.     Abdominal aortic aneurysm  Status post endovascular repair of abdominal aortic aneurysm 12/10  Postoperative pain  - asa, statin        Hypertension  - bp controlled post op off meds. Parameters per vascular      Thrombocytopenia- plts improved, likely drop reactive    Day of discharge exam:  Vitals:    12/16/24 0420   BP: 149/59   Pulse: 78   Resp:    Temp:      No cp, dizziness, sob    No acute distress, alert and oriented   Lungs clear  Heart regular  Abdomen benign    Total time coordinating care 32 min       Patient and/or family had opportunity to ask questions and expressed understanding and agreement with therapeutic plan as outlined         [facility] Hospitalist  [phone]  Answering Service:  [phone]      Electronically signed by Barrett Barrientos DO on 12/16/2024 10:33 PM         REVIEWER COMMENTS

## (undated) DEVICE — Device: Brand: VISIONS PV .035 DIGITAL IVUS CATHETER

## (undated) DEVICE — RADIFOCUS GLIDEWIRE ADVANTAGE GUIDEWIRE: Brand: GLIDEWIRE ADVANTAGE

## (undated) DEVICE — Device

## (undated) DEVICE — Device: Brand: ASTATO XS 20

## (undated) DEVICE — 3M™ BAIR HUGGER® UNDERBODY BLANKET, FULL ACCESS, 10 PER CASE 63500: Brand: BAIR HUGGER™

## (undated) DEVICE — PINNACLE INTRODUCER SHEATH: Brand: PINNACLE

## (undated) DEVICE — BEACON TIP TORCON NB ADVANTAGE CATHETER: Brand: BEACON TIP TORCON NB

## (undated) DEVICE — APPLICATOR PREP 26ML CHG 2% ISO ALC 70%

## (undated) DEVICE — PERCLOSE PROGLIDE™ SUTURE-MEDIATED CLOSURE SYSTEM: Brand: PERCLOSE PROGLIDE™

## (undated) DEVICE — CATH TEMPO 5F PIG 110CM 5SH: Brand: TEMPO

## (undated) DEVICE — PERCLOSE™ PROSTYLE™ SUTURE-MEDIATED CLOSURE AND REPAIR SYSTEM: Brand: PERCLOSE™ PROSTYLE™

## (undated) DEVICE — ADVANCE, 35LP LOW PROFILE PTA BALLOON DILATATION CATHETER: Brand: ADVANCE

## (undated) DEVICE — KIT INFL DEV 20ML W/ INSRT TOOL 3 W STPCOCK

## (undated) DEVICE — SHEATH INTRO 6.5FR L55CM DEFLECTION L17MM

## (undated) DEVICE — INDY OTW VASCULAR RETRIEVER: Brand: INDY OTW

## (undated) DEVICE — SLEEVE COMPR MD KNEE LEN SGL USE KENDALL SCD

## (undated) DEVICE — CATHETER ANGIO 5FR L65CM GWIRE 0.038IN KMP

## (undated) DEVICE — PACK CV CUSTOM

## (undated) DEVICE — PROXIMATE RH ROTATING HEAD SKIN STAPLERS (35 WIDE) CONTAINS 35 STAINLESS STEEL STAPLES: Brand: PROXIMATE

## (undated) DEVICE — GUIDEWIRE VASC 0.035INX145CM J TIP 3MM PTFE

## (undated) DEVICE — STEERABLE GUIDEWIRE: Brand: BACK-UP MEIER

## (undated) DEVICE — CODA, BALLOON CATHETER: Brand: CODA

## (undated) DEVICE — FLEXOR, CHECK-FLO, INTRODUCER ANSEL 1 MODIFICATION - WITH HIGH-FLEX DILATOR AND HYDROPHILIC COATING: Brand: FLEXOR

## (undated) DEVICE — NAVICROSS SUPPORT CATHETER: Brand: NAVICROSS

## (undated) DEVICE — SOLUTION IRRIG 1000ML 0.9% NACL USP BTL

## (undated) DEVICE — RADIFOCUS GLIDEWIRE: Brand: GLIDEWIRE

## (undated) DEVICE — PTA BALLOON DILATATION CATHETER: Brand: MUSTANG™

## (undated) DEVICE — GUIDEWIRE: Brand: AMPLATZ SUPER STIFF™

## (undated) DEVICE — 3M™ IOBAN™ 2 ANTIMICROBIAL INCISE DRAPE 6651EZ: Brand: IOBAN™ 2

## (undated) DEVICE — Device: Brand: QUICK-CROSS SUPPORT CATHETER

## (undated) DEVICE — FLEXOR, CHECK-FLO, INTRODUCER ANSEL MODIFICATION - WITH HIGH-FLEX DILATOR AND HYDROPHILIC COATING: Brand: FLEXOR